# Patient Record
Sex: MALE | Race: WHITE | Employment: FULL TIME | URBAN - METROPOLITAN AREA
[De-identification: names, ages, dates, MRNs, and addresses within clinical notes are randomized per-mention and may not be internally consistent; named-entity substitution may affect disease eponyms.]

---

## 2017-07-16 DIAGNOSIS — E78.5 HYPERLIPIDEMIA LDL GOAL <130: ICD-10-CM

## 2017-07-17 NOTE — TELEPHONE ENCOUNTER
SIMVASTATIN 10MG TABLETS       Last Written Prescription Date: 6/7/16  Last Fill Quantity: 90, # refills: 3  Last Office Visit with G, P or [facility] prescribing provider: 9/20/16       Lab Results   Component Value Date    CHOL 155 09/15/2016     Lab Results   Component Value Date    HDL 44 09/15/2016     Lab Results   Component Value Date    LDL 90 09/15/2016     Lab Results   Component Value Date    TRIG 104 09/15/2016     Lab Results   Component Value Date    CHOLHDLRATIO 3.2 11/06/2014

## 2017-07-19 DIAGNOSIS — E78.5 HYPERLIPIDEMIA LDL GOAL <130: ICD-10-CM

## 2017-07-19 RX ORDER — SIMVASTATIN 10 MG
TABLET ORAL
Qty: 90 TABLET | Refills: 0 | OUTPATIENT
Start: 2017-07-19

## 2017-07-19 RX ORDER — SIMVASTATIN 10 MG
TABLET ORAL
Qty: 90 TABLET | Refills: 0 | Status: SHIPPED | OUTPATIENT
Start: 2017-07-19 | End: 2018-02-25

## 2017-07-19 NOTE — TELEPHONE ENCOUNTER
Prescription approved per [facility] [place].  Susan Samson RN

## 2017-08-04 ENCOUNTER — RADIANT APPOINTMENT (OUTPATIENT)
Dept: GENERAL RADIOLOGY | Facility: CLINIC | Age: 47
End: 2017-08-04
Attending: FAMILY MEDICINE
Payer: COMMERCIAL

## 2017-08-04 ENCOUNTER — OFFICE VISIT (OUTPATIENT)
Dept: FAMILY MEDICINE | Facility: CLINIC | Age: 47
End: 2017-08-04
Payer: COMMERCIAL

## 2017-08-04 VITALS
TEMPERATURE: 96.4 F | SYSTOLIC BLOOD PRESSURE: 121 MMHG | OXYGEN SATURATION: 98 % | HEIGHT: 74 IN | HEART RATE: 54 BPM | BODY MASS INDEX: 27.08 KG/M2 | WEIGHT: 211 LBS | DIASTOLIC BLOOD PRESSURE: 82 MMHG

## 2017-08-04 DIAGNOSIS — R07.81 RIB PAIN ON LEFT SIDE: ICD-10-CM

## 2017-08-04 DIAGNOSIS — R07.81 RIB PAIN ON RIGHT SIDE: Primary | ICD-10-CM

## 2017-08-04 DIAGNOSIS — R07.81 RIB PAIN ON RIGHT SIDE: ICD-10-CM

## 2017-08-04 LAB
ALBUMIN UR-MCNC: NEGATIVE MG/DL
APPEARANCE UR: CLEAR
BILIRUB UR QL STRIP: NEGATIVE
COLOR UR AUTO: YELLOW
GLUCOSE UR STRIP-MCNC: NEGATIVE MG/DL
HGB UR QL STRIP: NEGATIVE
KETONES UR STRIP-MCNC: NEGATIVE MG/DL
LEUKOCYTE ESTERASE UR QL STRIP: NEGATIVE
NITRATE UR QL: NEGATIVE
PH UR STRIP: 5.5 PH (ref 5–7)
SP GR UR STRIP: 1.02 (ref 1–1.03)
URN SPEC COLLECT METH UR: NORMAL
UROBILINOGEN UR STRIP-ACNC: 0.2 EU/DL (ref 0.2–1)

## 2017-08-04 PROCEDURE — 81003 URINALYSIS AUTO W/O SCOPE: CPT | Performed by: FAMILY MEDICINE

## 2017-08-04 PROCEDURE — 71101 X-RAY EXAM UNILAT RIBS/CHEST: CPT | Mod: RT

## 2017-08-04 PROCEDURE — 99214 OFFICE O/P EST MOD 30 MIN: CPT | Performed by: FAMILY MEDICINE

## 2017-08-04 NOTE — MR AVS SNAPSHOT
"              After Visit Summary   8/4/2017    Fercho Phillips    MRN: [id number]           Patient Information     Date Of Birth          1970        Visit Information        Provider Department      8/4/2017 1:40 PM Ranulfo Najera MD [facility] ([facility]        Today's Diagnoses     Rib pain on right side    -  1       Follow-ups after your visit        Who to contact     Normal or non-critical lab and imaging results will be communicated to you by Sylantrohart, letter or phone within 4 business days after the clinic has received the results. If you do not hear from us within 7 days, please contact the clinic through Sylantrohart or phone. If you have a critical or abnormal lab result, we will notify you by phone as soon as possible.  Submit refill requests through Beatrobo or call your pharmacy and they will forward the refill request to us. Please allow 3 business days for your refill to be completed.          If you need to speak with a  for additional information , please call: [phone]             Additional Information About Your Visit        Beatrobo Information     Beatrobo gives you secure access to your electronic health record. If you see a primary care provider, you can also send messages to your care team and make appointments. If you have questions, please call your primary care clinic.  If you do not have a primary care provider, please call [phone] and they will assist you.        Care EveryWhere ID     This is your Care EveryWhere ID. This could be used by other organizations to access your [place] medical records  [id number]        Your Vitals Were     Pulse Temperature Height Pulse Oximetry BMI (Body Mass Index)       54 96.4  F (35.8  C) (Tympanic) 6' 1.5\" (1.867 m) 98% 27.46 kg/m2        Blood Pressure from Last 3 Encounters:   08/04/17 121/82   11/10/16 116/82   09/20/16 114/78    Weight from Last 3 Encounters:   08/04/17 211 lb (95.7 kg)   11/10/16 204 lb 11.2 oz (92.9 " kg)   09/20/16 202 lb (91.6 kg)              We Performed the Following     *UA reflex to Microscopic and Culture ([place] and [facility] (except [place] and [place])        Primary Care Provider Office Phone # Fax #    Ranulfo Najera [phone]       [facility] [address] [facility] [address]        Equal Access to Services     [facility] HONORIO : Hadii aad ku hadasho Soomaali, waaxda luqadaha, qaybta kaalmada adeegyada, waxay idiin hayaan adeeg kharash la'aan ah. So [facility] [phone].    ATENCIÓN: Si habla español, tiene a kaur disposición servicios gratuitos de asistencia lingüística. Llame al [phone].    We comply with applicable federal civil rights laws and [place] laws. We do not discriminate on the basis of race, color, national origin, age, disability sex, sexual orientation or gender identity.            Thank you!     Thank you for choosing [facility]  for your care. Our goal is always to provide you with excellent care. Hearing back from our patients is one way we can continue to improve our services. Please take a few minutes to complete the written survey that you may receive in the mail after your visit with us. Thank you!             Your Updated Medication List - Protect others around you: Learn how to safely use, store and throw away your medicines at www.disposemymeds.org.          This list is accurate as of: 8/4/17 11:59 PM.  Always use your most recent med list.                   Brand Name Dispense Instructions for use Diagnosis    aspirin 81 MG tablet      1 TABLET DAILY        cinnamon 500 MG Tabs      Take 1 tablet by mouth daily.        Co Q 10 10 MG Caps      Take  by mouth.        FISH OIL PO      Take 1 capsule by mouth daily.        flax seed oil 1000 MG capsule      Take 1 capsule by mouth daily.        FRUIT & VEGETABLE DAILY PO      Take 1 capsule by mouth daily.        Garlic 1000 MG Caps      Take 1 tablet by mouth daily.         ibuprofen 200 MG tablet    ADVIL/MOTRIN     Take 2 tablets by mouth daily as needed.        Multi-vitamin Tabs tablet   Generic drug:  multivitamin, therapeutic with minerals      Take 1 tablet by mouth daily.        NIACIN PO      Take 1 tablet by mouth daily        red yeast rice 600 MG Caps      Take 1 capsule by mouth daily.        Resveratrol 100 MG Caps      Take 1 capsule by mouth daily.        simvastatin 10 MG tablet    ZOCOR    90 tablet    TAKE 1 TABLET(10 MG) BY MOUTH AT BEDTIME    Hyperlipidemia LDL goal <130       UNABLE TO FIND      Take 1 tablet by mouth daily. MEDICATION NAME: L-Anginine & L-Citrulline 500mg        VITAMIN D3 PO      Take 1 tablet by mouth daily.

## 2017-08-04 NOTE — NURSING NOTE
"Chief Complaint   Patient presents with     Rib Pain       Initial /82 (BP Location: Right arm, Patient Position: Sitting, Cuff Size: Adult Large)  Pulse 54  Temp 96.4  F (35.8  C) (Tympanic)  Ht 6' 1.5\" (1.867 m)  Wt 211 lb (95.7 kg)  BMI 27.46 kg/m2 Estimated body mass index is 27.46 kg/(m^2) as calculated from the following:    Height as of this encounter: 6' 1.5\" (1.867 m).    Weight as of this encounter: 211 lb (95.7 kg).  Medication Reconciliation: complete   Shelley Pacheco LPN    "

## 2017-08-04 NOTE — PROGRESS NOTES
"  SUBJECTIVE:                                                    Fercho Phillips is a 47 year old male who presents to clinic today for the following health issues:    Rib pain-    Patient said the pain started about 2 weeks ago on the lower right side of his ribs. Patient said he has been having trouble sleeping on his left side due to the pain on his right lower rib area. Patient said the pain is \"achy\" and is intermittent. Patient denies any injury to the area.  Patient denies any shortness of breath or trouble breathing.       Problem list and histories reviewed & adjusted, as indicated.  Additional history: as documented    Patient Active Problem List   Diagnosis     HYPERLIPIDEMIA LDL GOAL <130     Achilles tendon tear     Abnormality of gait     Elevated LFTs     Health Care Home     Past Surgical History:   Procedure Laterality Date     COLONOSCOPY N/A 1/11/2016    Procedure: COLONOSCOPY;  Surgeon: Kumar Nunez MD;  Location: [facility]     Left shoulder scope      Left shoulder scope     Right elbow repair      Right elbow repair     Right thumb repair      Right thumb repair       Social History   Substance Use Topics     Smoking status: Never Smoker     Smokeless tobacco: Never Used     Alcohol use Yes      Comment: 2-3 beers per week     Family History   Problem Relation Age of Onset     HEART DISEASE Father      heart disease, angioplasty with stent     Cancer - colorectal Father      C.A.D. Father      Breast Cancer Mother              Reviewed and updated as needed this visit by clinical staff       Reviewed and updated as needed this visit by Provider         ROS:  Constitutional, HEENT, cardiovascular, pulmonary, gi and gu systems are negative, except as otherwise noted.    OBJECTIVE:   /82 (BP Location: Right arm, Patient Position: Sitting, Cuff Size: Adult Large)  Pulse 54  Temp 96.4  F (35.8  C) (Tympanic)  Ht 6' 1.5\" (1.867 m)  Wt 211 lb (95.7 kg)  SpO2 98%  BMI 27.46 kg/m2  Body mass " index is 27.46 kg/(m^2).  GENERAL: healthy, alert and no distress  NECK: no adenopathy, no asymmetry, masses, or scars and thyroid normal to palpation  RESP: lungs clear to auscultation - no rales, rhonchi or wheezes  CV: regular rate and rhythm, normal S1 S2, no S3 or S4, no murmur, click or rub, no peripheral edema and peripheral pulses strong  ABDOMEN: soft, nontender, no hepatosplenomegaly, no masses and bowel sounds normal  MS: no gross musculoskeletal defects noted, no edema    Diagnostic Test Results:  none     ASSESSMENT/PLAN:   1. Rib pain on right side  UA reflex to Microscopic and Culture ([place] and [facility] (except [place] and [place])   XR Ribs & Chest Right G/E 3 Views;    Most likely muscle pull/. He will work on posture, stretching  return to clinic, or call if unimproved in 2-4 weeks sooner if worse.              Ranulfo Najera MD  [facility]

## 2018-02-25 DIAGNOSIS — E78.5 HYPERLIPIDEMIA LDL GOAL <130: ICD-10-CM

## 2018-02-26 NOTE — TELEPHONE ENCOUNTER
"SIMVASTATIN 10MG TABLETS        Last Written Prescription Date:  7/19/17  Last Fill Quantity: 90,   # refills: 0  Last Office Visit: 2/14/18  Future Office visit:       Requested Prescriptions   Pending Prescriptions Disp Refills     simvastatin (ZOCOR) 10 MG tablet [Pharmacy Med Name: SIMVASTATIN 10MG TABLETS] 90 tablet 0     Sig: TAKE 1 TABLET BY MOUTH EVERY DAY IN THE EVENING    Statins Protocol Failed    2/25/2018  9:41 AM       Failed - LDL on file in past 12 months    Recent Labs   Lab Test  09/15/16   0933   LDL  90            Passed - No abnormal creatine kinase in past 12 months    No lab results found.         Passed - Recent or future visit with authorizing provider    Patient had office visit in the last year or has a visit in the next 30 days with authorizing provider.  See \"Patient Info\" tab in inbasket, or \"Choose Columns\" in Meds & Orders section of the refill encounter.            Passed - Patient is age 18 or older          "

## 2018-02-27 NOTE — TELEPHONE ENCOUNTER
Routing refill request to provider for review/approval because:  Patient needs to be seen because it has been more than 1 year since last office visit. Medication last reviewed on 4-11-16.  Labs not current:      LDL Cholesterol Calculated 90  <100 mg/dL Final 09/15/2016  9:33 AM     Looks like there is a break in the medication, last refill on 7-19-17 for 90 days with 0 refills.    KIMBERLY Alonzo

## 2018-02-28 RX ORDER — SIMVASTATIN 10 MG
TABLET ORAL
Qty: 90 TABLET | Refills: 0 | Status: SHIPPED | OUTPATIENT
Start: 2018-02-28 | End: 2018-06-06

## 2018-05-21 ENCOUNTER — TRANSFERRED RECORDS (OUTPATIENT)
Dept: HEALTH INFORMATION MANAGEMENT | Facility: CLINIC | Age: 48
End: 2018-05-21

## 2018-06-06 DIAGNOSIS — E78.5 HYPERLIPIDEMIA LDL GOAL <130: ICD-10-CM

## 2018-06-07 RX ORDER — SIMVASTATIN 10 MG
10 TABLET ORAL AT BEDTIME
Qty: 90 TABLET | Refills: 0 | Status: SHIPPED | OUTPATIENT
Start: 2018-06-07 | End: 2018-10-04

## 2018-06-07 NOTE — TELEPHONE ENCOUNTER
"SIMVASTATIN 10MG TABLETS        Last Written Prescription Date:  2/28/18  Last Fill Quantity: 90,   # refills: 0  Last Office Visit: 2/14/18  Future Office visit:       Requested Prescriptions   Pending Prescriptions Disp Refills     simvastatin (ZOCOR) 10 MG tablet [Pharmacy Med Name: SIMVASTATIN 10MG TABLETS] 90 tablet 0     Sig: TAKE 1 TABLET BY MOUTH EVERY DAY IN THE EVENING    Statins Protocol Failed    6/6/2018  6:27 PM       Failed - LDL on file in past 12 months    Recent Labs   Lab Test  09/15/16   0933   LDL  90            Passed - No abnormal creatine kinase in past 12 months    No lab results found.            Passed - Recent (12 mo) or future (30 days) visit within the authorizing provider's specialty    Patient had office visit in the last 12 months or has a visit in the next 30 days with authorizing provider or within the authorizing provider's specialty.  See \"Patient Info\" tab in inbasket, or \"Choose Columns\" in Meds & Orders section of the refill encounter.           Passed - Patient is age 18 or older          "

## 2018-10-04 ENCOUNTER — TELEPHONE (OUTPATIENT)
Dept: FAMILY MEDICINE | Facility: CLINIC | Age: 48
End: 2018-10-04

## 2018-10-04 DIAGNOSIS — E78.5 HYPERLIPIDEMIA LDL GOAL <130: ICD-10-CM

## 2018-10-05 DIAGNOSIS — E78.5 HYPERLIPIDEMIA LDL GOAL <130: ICD-10-CM

## 2018-10-05 RX ORDER — SIMVASTATIN 10 MG
TABLET ORAL
Qty: 90 TABLET | Refills: 0 | OUTPATIENT
Start: 2018-10-05

## 2018-10-05 RX ORDER — SIMVASTATIN 10 MG
TABLET ORAL
Qty: 10 TABLET | Refills: 0 | Status: SHIPPED | OUTPATIENT
Start: 2018-10-05 | End: 2018-10-08

## 2018-10-05 NOTE — TELEPHONE ENCOUNTER
Fercho calling for his medication.  He is out.  He did make an appointment for Monday with Dr. Najera.  Can he just get enough for today and over the weekend?  Please review and advise. Thank you..Kisha Crum

## 2018-10-05 NOTE — TELEPHONE ENCOUNTER
Sent refill for 10 pills only till patient is seen. Left message to call us back to notify.    KIMBERLY Alonzo

## 2018-10-05 NOTE — TELEPHONE ENCOUNTER
"Requested Prescriptions   Pending Prescriptions Disp Refills     simvastatin (ZOCOR) 10 MG tablet [Pharmacy Med Name: SIMVASTATIN 10MG TABLETS] 90 tablet 0    Last Written Prescription Date:  6/7/18  Last Fill Quantity: 90,  # refills: 0   Last office visit: 8/4/2017 with prescribing provider:  [facility]   Future Office Visit:     Sig: TAKE 1 TABLET(10 MG) BY MOUTH AT BEDTIME    Statins Protocol Failed    10/4/2018 10:53 PM       Failed - LDL on file in past 12 months    Recent Labs   Lab Test  09/15/16   0933   LDL  90            Failed - Recent (12 mo) or future (30 days) visit within the authorizing provider's specialty    Patient had office visit in the last 12 months or has a visit in the next 30 days with authorizing provider or within the authorizing provider's specialty.  See \"Patient Info\" tab in inbasket, or \"Choose Columns\" in Meds & Orders section of the refill encounter.           Passed - No abnormal creatine kinase in past 12 months    No lab results found.            Passed - Patient is age 18 or older          "

## 2018-10-08 ENCOUNTER — OFFICE VISIT (OUTPATIENT)
Dept: FAMILY MEDICINE | Facility: CLINIC | Age: 48
End: 2018-10-08
Payer: COMMERCIAL

## 2018-10-08 VITALS
HEIGHT: 73 IN | TEMPERATURE: 97.9 F | BODY MASS INDEX: 27.73 KG/M2 | HEART RATE: 65 BPM | DIASTOLIC BLOOD PRESSURE: 76 MMHG | SYSTOLIC BLOOD PRESSURE: 132 MMHG | WEIGHT: 209.2 LBS

## 2018-10-08 DIAGNOSIS — E78.5 HYPERLIPIDEMIA LDL GOAL <130: ICD-10-CM

## 2018-10-08 DIAGNOSIS — Z23 NEED FOR PROPHYLACTIC VACCINATION AND INOCULATION AGAINST INFLUENZA: ICD-10-CM

## 2018-10-08 DIAGNOSIS — J02.9 ACUTE PHARYNGITIS, UNSPECIFIED ETIOLOGY: ICD-10-CM

## 2018-10-08 DIAGNOSIS — R79.89 ELEVATED LFTS: Primary | ICD-10-CM

## 2018-10-08 LAB
ALBUMIN SERPL-MCNC: 4.3 G/DL (ref 3.4–5)
ALP SERPL-CCNC: 60 U/L (ref 40–150)
ALT SERPL W P-5'-P-CCNC: 37 U/L (ref 0–70)
ANION GAP SERPL CALCULATED.3IONS-SCNC: 8 MMOL/L (ref 3–14)
AST SERPL W P-5'-P-CCNC: 25 U/L (ref 0–45)
BILIRUB DIRECT SERPL-MCNC: 0.2 MG/DL (ref 0–0.2)
BILIRUB SERPL-MCNC: 1.2 MG/DL (ref 0.2–1.3)
BUN SERPL-MCNC: 13 MG/DL (ref 7–30)
CALCIUM SERPL-MCNC: 9 MG/DL (ref 8.5–10.1)
CHLORIDE SERPL-SCNC: 104 MMOL/L (ref 94–109)
CHOLEST SERPL-MCNC: 159 MG/DL
CO2 SERPL-SCNC: 26 MMOL/L (ref 20–32)
CREAT SERPL-MCNC: 0.97 MG/DL (ref 0.66–1.25)
GFR SERPL CREATININE-BSD FRML MDRD: 82 ML/MIN/1.7M2
GLUCOSE SERPL-MCNC: 103 MG/DL (ref 70–99)
HDLC SERPL-MCNC: 43 MG/DL
HETEROPH AB SER QL: NEGATIVE
LDLC SERPL CALC-MCNC: 102 MG/DL
NONHDLC SERPL-MCNC: 116 MG/DL
POTASSIUM SERPL-SCNC: 4.1 MMOL/L (ref 3.4–5.3)
PROT SERPL-MCNC: 7.4 G/DL (ref 6.8–8.8)
SODIUM SERPL-SCNC: 138 MMOL/L (ref 133–144)
TRIGL SERPL-MCNC: 72 MG/DL

## 2018-10-08 PROCEDURE — 86308 HETEROPHILE ANTIBODY SCREEN: CPT | Performed by: FAMILY MEDICINE

## 2018-10-08 PROCEDURE — 80053 COMPREHEN METABOLIC PANEL: CPT | Performed by: FAMILY MEDICINE

## 2018-10-08 PROCEDURE — 36415 COLL VENOUS BLD VENIPUNCTURE: CPT | Performed by: FAMILY MEDICINE

## 2018-10-08 PROCEDURE — 90686 IIV4 VACC NO PRSV 0.5 ML IM: CPT | Performed by: FAMILY MEDICINE

## 2018-10-08 PROCEDURE — 80061 LIPID PANEL: CPT | Performed by: FAMILY MEDICINE

## 2018-10-08 PROCEDURE — 82248 BILIRUBIN DIRECT: CPT | Performed by: FAMILY MEDICINE

## 2018-10-08 PROCEDURE — 99213 OFFICE O/P EST LOW 20 MIN: CPT | Mod: 25 | Performed by: FAMILY MEDICINE

## 2018-10-08 PROCEDURE — 90471 IMMUNIZATION ADMIN: CPT | Performed by: FAMILY MEDICINE

## 2018-10-08 RX ORDER — SIMVASTATIN 10 MG
TABLET ORAL
Qty: 90 TABLET | Refills: 0 | Status: SHIPPED | OUTPATIENT
Start: 2018-10-08 | End: 2018-10-24

## 2018-10-08 NOTE — PROGRESS NOTES
SUBJECTIVE:   Fercho Phillips is a 48 year old male who presents to clinic today for the following health issues:    Hyperlipidemia Follow-Up      Rate your low fat/cholesterol diet?: good    Taking statin?  Yes, no muscle aches from statin    Other lipid medications/supplements?:  none      Amount of exercise or physical activity: 4-5 days/week for an average of 30-45 minutes    Problems taking medications regularly: No    Medication side effects: none    Diet: low fat/cholesterol    *  Twitching in left cheek, started Friday             Problem list and histories reviewed & adjusted, as indicated.  Additional history: as documented    Patient Active Problem List   Diagnosis     HYPERLIPIDEMIA LDL GOAL <130     Achilles tendon tear     Abnormality of gait     Elevated LFTs     Health Care Home     Past Surgical History:   Procedure Laterality Date     COLONOSCOPY N/A 1/11/2016    Procedure: COLONOSCOPY;  Surgeon: Kumar Nunez MD;  Location: [facility]     Left shoulder scope      Left shoulder scope     Right elbow repair      Right elbow repair     Right thumb repair      Right thumb repair       Social History   Substance Use Topics     Smoking status: Never Smoker     Smokeless tobacco: Never Used     Alcohol use Yes      Comment: 2-3 beers per week     Family History   Problem Relation Age of Onset     HEART DISEASE Father      heart disease, angioplasty with stent     Cancer - colorectal Father      C.A.D. Father      Breast Cancer Mother          Current Outpatient Prescriptions   Medication Sig Dispense Refill     ASPIRIN 81 MG OR TABS 1 TABLET DAILY       Cholecalciferol (VITAMIN D3 PO) Take 1 tablet by mouth daily.       cinnamon 500 MG TABS Take 1 tablet by mouth daily.       Coenzyme Q10 (CO Q 10) 10 MG CAPS Take  by mouth.       Flaxseed, Linseed, (FLAX SEED OIL) 1000 MG capsule Take 1 capsule by mouth daily.       Garlic 1000 MG CAPS Take 1 tablet by mouth daily.       Multiple Vitamin (MULTI-VITAMIN) per  "tablet Take 1 tablet by mouth daily.       NIACIN PO Take 1 tablet by mouth daily       Nutritional Supplements (FRUIT & VEGETABLE DAILY PO) Take 1 capsule by mouth daily.       Omega-3 Fatty Acids (FISH OIL PO) Take 1 capsule by mouth daily.       red yeast rice 600 MG CAPS Take 1 capsule by mouth daily.       Resveratrol 100 MG CAPS Take 1 capsule by mouth daily.       simvastatin (ZOCOR) 10 MG tablet TAKE 1 TABLET(10 MG) BY MOUTH AT BEDTIME 90 tablet 0     UNABLE TO FIND Take 1 tablet by mouth daily. MEDICATION NAME: L-Anginine & L-Citrulline  500mg       ibuprofen (ADVIL,MOTRIN) 200 MG tablet Take 2 tablets by mouth daily as needed.       [DISCONTINUED] simvastatin (ZOCOR) 10 MG tablet TAKE 1 TABLET(10 MG) BY MOUTH AT BEDTIME 10 tablet 0     No Known Allergies    Reviewed and updated as needed this visit by clinical staff  Tobacco  Allergies  Meds  Med Hx  Surg Hx  Fam Hx  Soc Hx      Reviewed and updated as needed this visit by Provider         ROS:  Constitutional, HEENT, cardiovascular, pulmonary, gi and gu systems are negative, except as otherwise noted.    OBJECTIVE:     /76  Pulse 65  Temp 97.9  F (36.6  C) (Tympanic)  Ht 6' 1.43\" (1.865 m)  Wt 209 lb 3.2 oz (94.9 kg)  BMI 27.28 kg/m2  Body mass index is 27.28 kg/(m^2).  GENERAL: healthy, alert and no distress  NECK: no adenopathy, no asymmetry, masses, or scars and thyroid normal to palpation  RESP: lungs clear to auscultation - no rales, rhonchi or wheezes  CV: regular rate and rhythm, normal S1 S2, no S3 or S4, no murmur, click or rub, no peripheral edema and peripheral pulses strong  ABDOMEN: soft, nontender, no hepatosplenomegaly, no masses and bowel sounds normal  MS: no gross musculoskeletal defects noted, no edema    Diagnostic Test Results:  none     ASSESSMENT/PLAN:       1. Hyperlipidemia LDL goal <130    - simvastatin (ZOCOR) 10 MG tablet; TAKE 1 TABLET(10 MG) BY MOUTH AT BEDTIME  Dispense: 90 tablet; Refill: 0  - Lipid panel " reflex to direct LDL Fasting    2. Elevated LFTs    - Comprehensive metabolic panel (BMP + Alb, Alk Phos, ALT, AST, Total. Bili, TP)  - Bilirubin direct    3. Acute pharyngitis, unspecified etiology    - Mononucleosis screen  - Comprehensive metabolic panel (BMP + Alb, Alk Phos, ALT, AST, Total. Bili, TP)    4. Need for prophylactic vaccination and inoculation against influenza    - FLU VACCINE, SPLIT VIRUS, IM (QUADRIVALENT) [59128]- >3 YRS  - Vaccine Administration, Initial [85169]        Ranulfo Najera MD  [facility]    Injectable Influenza Immunization Documentation    1.  Is the person to be vaccinated sick today?   No    2. Does the person to be vaccinated have an allergy to a component   of the vaccine?   No  Egg Allergy Algorithm Link    3. Has the person to be vaccinated ever had a serious reaction   to influenza vaccine in the past?   No    4. Has the person to be vaccinated ever had Guillain-Barré syndrome?   No    Form completed by Merna Muhammad CMA

## 2018-10-08 NOTE — MR AVS SNAPSHOT
"              After Visit Summary   10/8/2018    Fercho Phillips    MRN: [id number]           Patient Information     Date Of Birth          1970        Visit Information        Provider Department      10/8/2018 11:20 AM Ranulfo Najera MD [facility]        Today's Diagnoses     Elevated LFTs    -  1    Hyperlipidemia LDL goal <130        Acute pharyngitis, unspecified etiology           Follow-ups after your visit        Who to contact     Normal or non-critical lab and imaging results will be communicated to you by Instabeathart, letter or phone within 4 business days after the clinic has received the results. If you do not hear from us within 7 days, please contact the clinic through Instabeathart or phone. If you have a critical or abnormal lab result, we will notify you by phone as soon as possible.  Submit refill requests through Spotbros or call your pharmacy and they will forward the refill request to us. Please allow 3 business days for your refill to be completed.          If you need to speak with a  for additional information , please call: [phone]             Additional Information About Your Visit        InstabeatharGrid20/20 Information     Spotbros gives you secure access to your electronic health record. If you see a primary care provider, you can also send messages to your care team and make appointments. If you have questions, please call your primary care clinic.  If you do not have a primary care provider, please call [phone] and they will assist you.        Care EveryWhere ID     This is your Care EveryWhere ID. This could be used by other organizations to access your [place] medical records  [id number]        Your Vitals Were     Pulse Temperature Height BMI (Body Mass Index)          65 97.9  F (36.6  C) (Tympanic) 6' 1.43\" (1.865 m) 27.28 kg/m2         Blood Pressure from Last 3 Encounters:   10/08/18 132/76   08/04/17 121/82   11/10/16 116/82    Weight from Last 3 " Encounters:   10/08/18 209 lb 3.2 oz (94.9 kg)   08/04/17 211 lb (95.7 kg)   11/10/16 204 lb 11.2 oz (92.9 kg)              We Performed the Following     Hepatic panel (Albumin, ALT, AST, Bili, Alk Phos, TP)     Lipid panel reflex to direct LDL Fasting     Mononucleosis screen          Where to get your medicines      These medications [id number] - [place], [place] [facility] [address], [place] [address]     Phone:  [phone]     simvastatin 10 MG tablet          Primary Care Provider Office Phone # Fax #    Ranulfo Najera [phone] [address] [facility] [address]        Equal Access to Services     CLARA OLMEDO : Hadii aad ku hadasho [place]ank, waaxda luqadaha, qaybta kaalmada adeisiyaseb, norma meléndez . So [facility] [phone].    ATENCIÓN: Si habla español, tiene a kaur disposición servicios gratuitos de asistencia lingüística. [facility] [phone].    We comply with applicable federal civil rights laws and [place] laws. We do not discriminate on the basis of race, color, national origin, age, disability, sex, sexual orientation, or gender identity.            Thank you!     Thank you for choosing [facility]  for your care. Our goal is always to provide you with excellent care. Hearing back from our patients is one way we can continue to improve our services. Please take a few minutes to complete the written survey that you may receive in the mail after your visit with us. Thank you!             Your Updated Medication List - Protect others around you: Learn how to safely use, store and throw away your medicines at www.disposemymeds.org.          This list is accurate as of 10/8/18 12:11 PM.  Always use your most recent med list.                   Brand Name Dispense Instructions for use Diagnosis    aspirin 81 MG tablet      1 TABLET DAILY        cinnamon 500  MG Tabs      Take 1 tablet by mouth daily.        Co Q 10 10 MG Caps      Take  by mouth.        FISH OIL PO      Take 1 capsule by mouth daily.        flax seed oil 1000 MG capsule      Take 1 capsule by mouth daily.        FRUIT & VEGETABLE DAILY PO      Take 1 capsule by mouth daily.        Garlic 1000 MG Caps      Take 1 tablet by mouth daily.        ibuprofen 200 MG tablet    ADVIL/MOTRIN     Take 2 tablets by mouth daily as needed.        Multi-vitamin Tabs tablet   Generic drug:  multivitamin, therapeutic with minerals      Take 1 tablet by mouth daily.        NIACIN PO      Take 1 tablet by mouth daily        red yeast rice 600 MG Caps      Take 1 capsule by mouth daily.        Resveratrol 100 MG Caps      Take 1 capsule by mouth daily.        simvastatin 10 MG tablet    ZOCOR    90 tablet    TAKE 1 TABLET(10 MG) BY MOUTH AT BEDTIME    Hyperlipidemia LDL goal <130       UNABLE TO FIND      Take 1 tablet by mouth daily. MEDICATION NAME: L-Anginine & L-Citrulline 500mg        VITAMIN D3 PO      Take 1 tablet by mouth daily.

## 2018-10-15 ENCOUNTER — OFFICE VISIT (OUTPATIENT)
Dept: FAMILY MEDICINE | Facility: CLINIC | Age: 48
End: 2018-10-15
Payer: COMMERCIAL

## 2018-10-15 VITALS
SYSTOLIC BLOOD PRESSURE: 136 MMHG | TEMPERATURE: 97.7 F | DIASTOLIC BLOOD PRESSURE: 80 MMHG | OXYGEN SATURATION: 98 % | WEIGHT: 214.4 LBS | HEART RATE: 76 BPM | HEIGHT: 73 IN | BODY MASS INDEX: 28.41 KG/M2

## 2018-10-15 DIAGNOSIS — J20.9 ACUTE BRONCHITIS, UNSPECIFIED ORGANISM: ICD-10-CM

## 2018-10-15 DIAGNOSIS — R82.90 NONSPECIFIC FINDING ON EXAMINATION OF URINE: Primary | ICD-10-CM

## 2018-10-15 LAB
DEPRECATED S PYO AG THROAT QL EIA: NORMAL
FLUAV+FLUBV AG SPEC QL: NEGATIVE
FLUAV+FLUBV AG SPEC QL: NEGATIVE
SPECIMEN SOURCE: NORMAL
SPECIMEN SOURCE: NORMAL

## 2018-10-15 PROCEDURE — 87081 CULTURE SCREEN ONLY: CPT | Performed by: FAMILY MEDICINE

## 2018-10-15 PROCEDURE — 87804 INFLUENZA ASSAY W/OPTIC: CPT | Performed by: FAMILY MEDICINE

## 2018-10-15 PROCEDURE — 87880 STREP A ASSAY W/OPTIC: CPT | Performed by: FAMILY MEDICINE

## 2018-10-15 PROCEDURE — 99213 OFFICE O/P EST LOW 20 MIN: CPT | Performed by: FAMILY MEDICINE

## 2018-10-15 RX ORDER — DOXYCYCLINE 100 MG/1
100 CAPSULE ORAL 2 TIMES DAILY
Qty: 20 CAPSULE | Refills: 0 | Status: SHIPPED | OUTPATIENT
Start: 2018-10-15 | End: 2019-01-17

## 2018-10-15 NOTE — PROGRESS NOTES
"  SUBJECTIVE:   Fercho Phillips is a 48 year old male who presents to clinic today for the following health issues:    ENT Symptoms             Symptoms: cc Present Absent Comment   Fever/Chills   x    Fatigue  x     Muscle Aches   x    Eye Irritation   x    Sneezing  x     Nasal Shane/Drg  x     Sinus Pressure/Pain  x     Loss of smell   x    Dental pain   x    Sore Throat x x     Swollen Glands   x    Ear Pain/Fullness  x     Cough  x     Wheeze   x    Chest Pain  x     Shortness of breath  x     Rash   x    Other   x      Symptom duration:  1 week    Symptom severity:  moderate    Treatments tried:  Ibuprofen    Contacts:  School - kids             Problem list and histories reviewed & adjusted, as indicated.  Additional history: as documented    Patient Active Problem List   Diagnosis     HYPERLIPIDEMIA LDL GOAL <130     Achilles tendon tear     Abnormality of gait     Elevated LFTs     Health Care Home     Past Surgical History:   Procedure Laterality Date     COLONOSCOPY N/A 1/11/2016    Procedure: COLONOSCOPY;  Surgeon: Kumar Nunez MD;  Location: [facility]     Left shoulder scope      Left shoulder scope     Right elbow repair      Right elbow repair     Right thumb repair      Right thumb repair       Social History   Substance Use Topics     Smoking status: Never Smoker     Smokeless tobacco: Never Used     Alcohol use Yes      Comment: 2-3 beers per week     Family History   Problem Relation Age of Onset     HEART DISEASE Father      heart disease, angioplasty with stent     Cancer - colorectal Father      C.A.D. Father      Breast Cancer Mother            Reviewed and updated as needed this visit by clinical staff       Reviewed and updated as needed this visit by Provider         ROS:  Constitutional, HEENT, cardiovascular, pulmonary, gi and gu systems are negative, except as otherwise noted.    OBJECTIVE:     /80  Pulse 76  Temp 97.7  F (36.5  C) (Tympanic)  Ht 6' 1.2\" (1.859 m)  Wt 214 lb 6.4 oz " (97.3 kg)  SpO2 98%  BMI 28.13 kg/m2  Body mass index is 28.13 kg/(m^2).  GENERAL: healthy, alert and no distress  NECK: no adenopathy, no asymmetry, masses, or scars and thyroid normal to palpation  RESP: lungs clear to auscultation - no rales, rhonchi or wheezes  CV: regular rate and rhythm, normal S1 S2, no S3 or S4, no murmur, click or rub, no peripheral edema and peripheral pulses strong  ABDOMEN: soft, nontender, no hepatosplenomegaly, no masses and bowel sounds normal  MS: no gross musculoskeletal defects noted, no edema      ASSESSMENT/PLAN:       1. Nonspecific finding on examination of urine    - Influenza A/B antigen  - Beta strep group A culture    2. Acute bronchitis, unspecified organism    - doxycycline (VIBRAMYCIN) 100 MG capsule; Take 1 capsule (100 mg) by mouth 2 times daily  Dispense: 20 capsule; Refill: 0    Ranulfo Najera MD  [facility]

## 2018-10-15 NOTE — MR AVS SNAPSHOT
"              After Visit Summary   10/15/2018    Fercho Phillips    MRN: [id number]           Patient Information     Date Of Birth          1970        Visit Information        Provider Department      10/15/2018 10:40 AM Ranulfo Najera MD [facility]        Today's Diagnoses     Nonspecific finding on examination of urine    -  1    Acute bronchitis, unspecified organism           Follow-ups after your visit        Who to contact     Normal or non-critical lab and imaging results will be communicated to you by [employer], letter or phone within 4 business days after the clinic has received the results. If you do not hear from us within 7 days, please contact the clinic through [employer] or phone. If you have a critical or abnormal lab result, we will notify you by phone as soon as possible.  Submit refill requests through [employer] or call your pharmacy and they will forward the refill request to us. Please allow 3 business days for your refill to be completed.          If you need to speak with a  for additional information , please call: [phone]             Additional Information About Your Visit        [employer] Information     [employer] gives you secure access to your electronic health record. If you see a primary care provider, you can also send messages to your care team and make appointments. If you have questions, please call your primary care clinic.  If you do not have a primary care provider, please call [phone] and they will assist you.        Care EveryWhere ID     This is your Care EveryWhere ID. This could be used by other organizations to access your [place] medical records  [id number]        Your Vitals Were     Pulse Temperature Height Pulse Oximetry BMI (Body Mass Index)       76 97.7  F (36.5  C) (Tympanic) 6' 1.2\" (1.859 m) 98% 28.13 kg/m2        Blood Pressure from Last 3 Encounters:   10/15/18 136/80   10/08/18 132/76   08/04/17 121/82    Weight from Last 3 " Encounters:   10/15/18 214 lb 6.4 oz (97.3 kg)   10/08/18 209 lb 3.2 oz (94.9 kg)   08/04/17 211 lb (95.7 kg)              We Performed the Following     Beta strep group A culture     Influenza A/B antigen     Strep, Rapid Screen          Today's Medication Changes          These changes are accurate as of 10/15/18  1:25 PM.  If you have any questions, ask your nurse or doctor.               Start taking these medicines.        Dose/Directions    doxycycline 100 MG capsule   Commonly known as:  VIBRAMYCIN   Used for:  Acute bronchitis, unspecified organism   Started by:  Ranulfo Najera MD        Dose:  100 mg   Take 1 capsule (100 mg) by mouth 2 times daily   Quantity:  20 capsule   Refills:  0            Where to get your medicines      These medications were sent to [id number] - [place], [place] - [address] [place] [address] [address] [place] [address]     Phone:  [phone]     doxycycline 100 MG capsule                Primary Care Provider Office Phone # Fax #    Ranulfo Najera [phone] [address] [facility] [address]        Equal Access to Services     [facility] HospitalSTEFANIE AH: Hadii aad ku hadasho Soomaali, waaxda luqadaha, qaybta kaalmada adeegyada, norma delgado. So [facility] [phone].    ATENCIÓN: Si habla español, tiene a kaur disposición servicios gratuitos de asistencia lingüística. Llame al [phone].    We comply with applicable federal civil rights laws and [place] laws. We do not discriminate on the basis of race, color, national origin, age, disability, sex, sexual orientation, or gender identity.            Thank you!     Thank you for choosing [facility]  for your care. Our goal is always to provide you with excellent care. Hearing back from our patients is one way we can continue to improve our services. Please take a few minutes to complete the written survey that you may  receive in the mail after your visit with us. Thank you!             Your Updated Medication List - Protect others around you: Learn how to safely use, store and throw away your medicines at www.disposemymeds.org.          This list is accurate as of 10/15/18  1:25 PM.  Always use your most recent med list.                   Brand Name Dispense Instructions for use Diagnosis    aspirin 81 MG tablet      1 TABLET DAILY        cinnamon 500 MG Tabs      Take 1 tablet by mouth daily.        Co Q 10 10 MG Caps      Take  by mouth.        doxycycline 100 MG capsule    VIBRAMYCIN    20 capsule    Take 1 capsule (100 mg) by mouth 2 times daily    Acute bronchitis, unspecified organism       FISH OIL PO      Take 1 capsule by mouth daily.        flax seed oil 1000 MG capsule      Take 1 capsule by mouth daily.        FRUIT & VEGETABLE DAILY PO      Take 1 capsule by mouth daily.        Garlic 1000 MG Caps      Take 1 tablet by mouth daily.        ibuprofen 200 MG tablet    ADVIL/MOTRIN     Take 2 tablets by mouth daily as needed.        Multi-vitamin Tabs tablet   Generic drug:  multivitamin, therapeutic with minerals      Take 1 tablet by mouth daily.        NIACIN PO      Take 1 tablet by mouth daily        red yeast rice 600 MG Caps      Take 1 capsule by mouth daily.        Resveratrol 100 MG Caps      Take 1 capsule by mouth daily.        simvastatin 10 MG tablet    ZOCOR    90 tablet    TAKE 1 TABLET(10 MG) BY MOUTH AT BEDTIME    Hyperlipidemia LDL goal <130       UNABLE TO FIND      Take 1 tablet by mouth daily. MEDICATION NAME: L-Anginine & L-Citrulline 500mg        VITAMIN D3 PO      Take 1 tablet by mouth daily.

## 2018-10-16 LAB
BACTERIA SPEC CULT: NORMAL
SPECIMEN SOURCE: NORMAL

## 2018-10-19 ENCOUNTER — HOSPITAL ENCOUNTER (EMERGENCY)
Facility: CLINIC | Age: 48
Discharge: HOME | End: 2018-10-19
Attending: PHYSICIAN ASSISTANT | Admitting: PHYSICIAN ASSISTANT
Payer: COMMERCIAL

## 2018-10-19 DIAGNOSIS — J06.9 VIRAL URI WITH COUGH: ICD-10-CM

## 2018-10-19 DIAGNOSIS — R25.3 FASCICULATIONS OF MUSCLE: ICD-10-CM

## 2018-10-19 PROCEDURE — 99213 OFFICE O/P EST LOW 20 MIN: CPT | Mod: Z6 | Performed by: PHYSICIAN ASSISTANT

## 2018-10-19 PROCEDURE — G0463 HOSPITAL OUTPT CLINIC VISIT: HCPCS | Performed by: PHYSICIAN ASSISTANT

## 2018-10-19 RX ORDER — BENZONATATE 100 MG/1
100 CAPSULE ORAL 3 TIMES DAILY PRN
Qty: 42 CAPSULE | Refills: 0 | Status: SHIPPED | OUTPATIENT
Start: 2018-10-19 | End: 2019-01-17

## 2018-10-19 NOTE — DISCHARGE INSTRUCTIONS
Viral Upper Respiratory Illness (Adult)  You have a viral upper respiratory illness (URI), which is another term for the common cold. This illness is contagious during the first few days. It is spread through the air by coughing and sneezing. It may also be spread by direct contact (touching the sick person and then touching your own eyes, nose, or mouth). Frequent handwashing will decrease risk of spread. Most viral illnesses go away within 7 to 10 days with rest and simple home remedies. Sometimes the illness may last for several weeks. Antibiotics will not kill a virus, and they are generally not prescribed for this condition.    Home care    If symptoms are severe, rest at home for the first 2 to 3 days. When you resume activity, don't let yourself get too tired.    Avoid being exposed to cigarette smoke (yours or others ).    You may use acetaminophen or ibuprofen to control pain and fever, unless another medicine was prescribed. If you have chronic liver or kidney disease, have ever had a stomach ulcer or gastrointestinal bleeding, or are taking blood-thinning medicines, talk with your healthcare provider before using these medicines. Aspirin should never be given to anyone under 18 years of age who is ill with a viral infection or fever. It may cause severe liver or brain damage.    Your appetite may be poor, so a light diet is fine. Avoid dehydration by drinking 6 to 8 glasses of fluids per day (water, soft drinks, juices, tea, or soup). Extra fluids will help loosen secretions in the nose and lungs.    Over-the-counter cold medicines will not shorten the length of time you re sick, but they may be helpful for the following symptoms: cough, sore throat, and nasal and sinus congestion. (Note: Do not use decongestants if you have high blood pressure.)  Follow-up care  Follow up with your healthcare provider, or as advised.  When to seek medical advice  Call your healthcare provider right away if any of these  occur:    Cough with lots of colored sputum (mucus)    Severe headache; face, neck, or ear pain    Difficulty swallowing due to throat pain    Fever of 100.4 F (38 C) or higher, or as directed by your healthcare provider  Call 911  Call 911 if any of these occur:    Chest pain, shortness of breath, wheezing, or difficulty breathing    Coughing up blood    Inability to swallow due to throat pain  Date Last Reviewed: 9/13/2015 2000-2017 [employer]. [address] [place] [address]. All rights reserved. This information is not intended as a substitute for professional medical care. Always follow your healthcare professional's instructions.

## 2018-10-19 NOTE — ED PROVIDER NOTES
History     Chief Complaint   Patient presents with     URI     having twitching in face that concerned him, on doxycycline     HPI  Fercho Phillips is a 48 year old male who presents to the urgent care with concern over possible muscle abnormality on the left side of his face is been present for approximately the last 2 weeks.  Patient initially described a twitching, sensation near his left cheek.  He did have URI symptoms including sore throat, nasal congestion, cough at onset and was evaluated by his primary care provider who performed negative rapid strep test and blood work including CMP.  Cough continued to worsen and patient became concerned that he closed his left eye and smiled he did not see significant muscle movement on the base of his neck like he would on the right.  He also states concern that there appears to be indent on his left cheek.  He was evaluated in the primary care clinic five days ago and diagnosed with bronchitis. He states he did bring concern over facial movement up with provider who recommended deferring further evaluation until cough had cleared.  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      Problem List:    Patient Active Problem List    Diagnosis Date Noted     Elevated LFTs 04/18/2012     Priority: Medium     Abnormality of gait 12/12/2011     Priority: Medium     Achilles tendon tear 11/30/2011     Priority: Medium     HYPERLIPIDEMIA LDL GOAL <130 10/31/2010     Priority: Medium     Health Care Home 06/27/2013     Priority: Low     EMERGENCY CARE PLAN  June 27, 2013: No current Care Coordination follow up planned. Please refer if Care Coordination services are needed.    Presenting Problem Signs and Symptoms Treatment Plan   Questions or concerns   during clinic hours   I will call my clinic directly:  [facility]  [address], [place] [address] [phone].    Questions or  concerns outside clinic hours   I will call the 24 hour nurse line at   [phone] or [phone].   Need to schedule an appointment   I will call the 24 hour scheduling team at [phone] or my clinic directly at [phone].    Same day treatment     I will call my clinic first, nurse line if after hours, urgent care and express care if needed.   Clinic care coordination services (regular clinic hours)     I will call a clinic care coordinator directly:     Josue Rivera RN  Mon, Tues, Fri - [phone]  Wed, Thurs - [phone]    Ida Hood, :    [phone]    Or call my clinic at [phone] and ask to speak with care coordination.   Crisis Services: Behavioral or Mental Health  [facility] 24 Hour Phone Line  [phone]    [facility] 24 Hour Crisis Services  [phone]    [facility]Behavioral Health Providers[facility] [phone]    [facility]   [phone]       Emergency treatment -- Immediately    CAll 871             Past Medical History:    History reviewed. No pertinent past medical history.    Past Surgical History:    Past Surgical History:   Procedure Laterality Date     COLONOSCOPY N/A 1/11/2016    Procedure: COLONOSCOPY;  Surgeon: Kumar Nunez MD;  Location: [facility]     Left shoulder scope      Left shoulder scope     Right elbow repair      Right elbow repair     Right thumb repair      Right thumb repair       Family History:    Family History   Problem Relation Age of Onset     HEART DISEASE Father      heart disease, angioplasty with stent     Cancer - colorectal Father      C.A.D. Father      Breast Cancer Mother        Social History:  Marital Status:   [2]  Social History   Substance Use Topics     Smoking status: Never Smoker     Smokeless tobacco: Never Used     Alcohol use Yes      Comment: 2-3 beers per week      Medications:      benzonatate (TESSALON) 100 MG capsule   ASPIRIN 81 MG OR TABS   Cholecalciferol (VITAMIN D3 PO)   cinnamon  500 MG TABS   Coenzyme Q10 (CO Q 10) 10 MG CAPS   doxycycline (VIBRAMYCIN) 100 MG capsule   Flaxseed, Linseed, (FLAX SEED OIL) 1000 MG capsule   Garlic 1000 MG CAPS   ibuprofen (ADVIL,MOTRIN) 200 MG tablet   Multiple Vitamin (MULTI-VITAMIN) per tablet   NIACIN PO   Nutritional Supplements (FRUIT & VEGETABLE DAILY PO)   Omega-3 Fatty Acids (FISH OIL PO)   red yeast rice 600 MG CAPS   Resveratrol 100 MG CAPS   simvastatin (ZOCOR) 10 MG tablet   UNABLE TO FIND         Review of Systems  CONSTITUTIONAL:NEGATIVE for fever, chills, change in weight  INTEGUMENTARY/SKIN: NEGATIVE for worrisome rashes, moles or lesions  EYES: NEGATIVE for vision changes, redness or discharge   ENT/MOUTH: POSITIVE for irregular muscle movements on left side of face resolving nasal congestion, sore throat and NEGATIVE for ear pain   RESP:POSITIVE for cough and NEGATIVE for SOB/dyspnea and wheezing  GI: NEGATIVE for abdominal pain, diarrhea, nausea and vomiting  NEURO: POSITIVE for involuntary movements on left side of face and NEGATIVE for headache, dizziness, lightheadedness, speech difficulty   Physical Exam   BP: (!) 144/105  Pulse: 72  Temp: 97.9  F (36.6  C)  Weight: 93 kg (205 lb)  SpO2: 96 %  Physical Exam  GENERAL APPEARANCE: healthy, alert and no distress  EYES: EOMI,  PERRL, conjunctiva clear  HENT: ear canals and TM's normal.  Nose and mouth without ulcers, erythema or lesions  NECK: supple, nontender, no lymphadenopathy  RESP: lungs clear to auscultation - no rales, rhonchi or wheezes  CV: regular rates and rhythm, normal S1 S2, no murmur noted  ABDOMEN:  soft, nontender, no HSM or masses and bowel sounds normal  NEURO: Normal strength and tone, sensory exam grossly normal,  normal speech and mentation, CN II-XII grossly intact   SKIN: no suspicious lesions or rashes  ED Course     ED Course     Procedures        Critical Care time:  none          Visual acuity was 20/30 in each eye   No results found for this or any previous visit  (from the past 24 hour(s)).    Medications - No data to display    Assessments & Plan (with Medical Decision Making)     I have reviewed the nursing notes.    I have reviewed the findings, diagnosis, plan and need for follow up with the patient.       Discharge Medication List as of 10/19/2018  5:02 PM      START taking these medications    Details   benzonatate (TESSALON) 100 MG capsule Take 1 capsule (100 mg) by mouth 3 times daily as needed for cough, Disp-42 capsule, R-0, E-Prescribe           Final diagnoses:   Viral URI with cough   Fasciculations of muscle     48-year-old male presents to urgent care with concern over irregular movements of muscle on the left side of his face and ongoing URI symptoms recently diagnosed as bronchitis and treated with doxycycline.  Patient had stable vital signs upon arrival.  Physical exam findings as described above were essentially benign.  There was no noted muscle movements at this time and face was symmetrical without evidence of unilateral weakness to suggest TIA/stroke migraine variant.  I suspect patient is having muscle fasciculations secondary to his stated fatigue.  He was discharged home stable with instructions for symptomatic treatment with rest, prescriptions for Tessalon given to control cough. follow-up with primary care provider if no improvement within the next 5-7 days.  Worrisome reasons to return to the ER/UC sooner discussed.    Disclaimer: This note consists of symbols derived from keyboarding, dictation, and/or voice recognition software. As a result, there may be errors in the script that have gone undetected.  Please consider this when interpreting information found in the chart.        10/19/2018   [facility] EMERGENCY DEPARTMENT     Shelley Wasserman PA-C  10/20/18 [id number]

## 2018-10-19 NOTE — ED TRIAGE NOTES
Patient presents today with spasms on left side of check, and patient reports that its indented . Symptoms started two weeks ago . Arrived to urgent care ambulatory . Patient is currently on antibiotics for URI.

## 2018-10-19 NOTE — ED AVS SNAPSHOT
[facility] Emergency Department    [address] [place] [address]    Phone:  [phone]    Fax:  [phone]                                       Fercho Phillips   MRN: [id number]    Department:  [facility] Emergency Department   Date of Visit:  10/19/2018           Patient Information     Date Of Birth          1970        Your diagnoses for this visit were:     Viral URI with cough     Fasciculations of muscle        You were seen by Shelley Wasserman PA-C.      Follow-up Information     Follow up with Ranulfo Najera MD In 7 days.    Specialty:  Family Practice    Why:  if no improvement or sooner if new or worsening symptoms     Contact information:    [address] [facility] [address]  [phone]          Discharge Instructions         Viral Upper Respiratory Illness (Adult)  You have a viral upper respiratory illness (URI), which is another term for the common cold. This illness is contagious during the first few days. It is spread through the air by coughing and sneezing. It may also be spread by direct contact (touching the sick person and then touching your own eyes, nose, or mouth). Frequent handwashing will decrease risk of spread. Most viral illnesses go away within 7 to 10 days with rest and simple home remedies. Sometimes the illness may last for several weeks. Antibiotics will not kill a virus, and they are generally not prescribed for this condition.    Home care    If symptoms are severe, rest at home for the first 2 to 3 days. When you resume activity, don't let yourself get too tired.    Avoid being exposed to cigarette smoke (yours or others ).    You may use acetaminophen or ibuprofen to control pain and fever, unless another medicine was prescribed. If you have chronic liver or kidney disease, have ever had a stomach ulcer or gastrointestinal bleeding, or are taking blood-thinning medicines, talk with your healthcare provider before using these medicines.  Aspirin should never be given to anyone under 18 years of age who is ill with a viral infection or fever. It may cause severe liver or brain damage.    Your appetite may be poor, so a light diet is fine. Avoid dehydration by drinking 6 to 8 glasses of fluids per day (water, soft drinks, juices, tea, or soup). Extra fluids will help loosen secretions in the nose and lungs.    Over-the-counter cold medicines will not shorten the length of time you re sick, but they may be helpful for the following symptoms: cough, sore throat, and nasal and sinus congestion. (Note: Do not use decongestants if you have high blood pressure.)  Follow-up care  Follow up with your healthcare provider, or as advised.  When to seek medical advice  Call your healthcare provider right away if any of these occur:    Cough with lots of colored sputum (mucus)    Severe headache; face, neck, or ear pain    Difficulty swallowing due to throat pain    Fever of 100.4 F (38 C) or higher, or as directed by your healthcare provider  Call 911  Call 911 if any of these occur:    Chest pain, shortness of breath, wheezing, or difficulty breathing    Coughing up blood    Inability to swallow due to throat pain  Date Last Reviewed: 9/13/2015 2000-2017 [employer]. [address] [place] [address]. All rights reserved. This information is not intended as a substitute for professional medical care. Always follow your healthcare professional's instructions.          Your next 10 appointments already scheduled     Oct 22, 2018  5:40 PM CDT   SHORT with Ranulfo Najera MD   [facility] ([facility]    [address] [address] [phone]              24 Hour Appointment Hotline       To make an appointment at any [facility], call [phone] ([phone]). If you don't have a family doctor or clinic, we will help you find one. [facility] are conveniently located to serve the needs  of you and your family.             Review of your medicines      START taking        Dose / Directions Last dose taken    benzonatate 100 MG capsule   Commonly known as:  TESSALON   Dose:  100 mg   Quantity:  42 capsule        Take 1 capsule (100 mg) by mouth 3 times daily as needed for cough   Refills:  0          Our records show that you are taking the medicines listed below. If these are incorrect, please call your family doctor or clinic.        Dose / Directions Last dose taken    aspirin 81 MG tablet        1 TABLET DAILY   Refills:  0        cinnamon 500 MG Tabs   Dose:  1 tablet        Take 1 tablet by mouth daily.   Refills:  0        Co Q 10 10 MG Caps        Take  by mouth.   Refills:  0        doxycycline 100 MG capsule   Commonly known as:  VIBRAMYCIN   Dose:  100 mg   Quantity:  20 capsule        Take 1 capsule (100 mg) by mouth 2 times daily   Refills:  0        FISH OIL PO   Dose:  1 capsule        Take 1 capsule by mouth daily.   Refills:  0        flax seed oil 1000 MG capsule   Dose:  1 capsule        Take 1 capsule by mouth daily.   Refills:  0        FRUIT & VEGETABLE DAILY PO   Dose:  1 capsule        Take 1 capsule by mouth daily.   Refills:  0        Garlic 1000 MG Caps   Dose:  1 tablet        Take 1 tablet by mouth daily.   Refills:  0        ibuprofen 200 MG tablet   Commonly known as:  ADVIL/MOTRIN   Dose:  2 tablet        Take 2 tablets by mouth daily as needed.   Refills:  0        Multi-vitamin Tabs tablet   Dose:  1 tablet   Generic drug:  multivitamin, therapeutic with minerals        Take 1 tablet by mouth daily.   Refills:  0        NIACIN PO   Dose:  1 tablet        Take 1 tablet by mouth daily   Refills:  0        red yeast rice 600 MG Caps   Dose:  1 capsule        Take 1 capsule by mouth daily.   Refills:  0        Resveratrol 100 MG Caps   Dose:  1 capsule        Take 1 capsule by mouth daily.   Refills:  0        simvastatin 10 MG tablet   Commonly known as:  ZOCOR    Quantity:  90 tablet        TAKE 1 TABLET(10 MG) BY MOUTH AT BEDTIME   Refills:  0        UNABLE TO FIND   Dose:  1 tablet        Take 1 tablet by mouth daily. MEDICATION NAME: L-Anginine & L-Citrulline 500mg   Refills:  0        VITAMIN D3 PO   Dose:  1 tablet        Take 1 tablet by mouth daily.   Refills:  0                Prescriptions were sent or printed at these locations (1 Prescription)                   [facility] [place], [place] - [address]   [address] [address]    Telephone:  [phone]   Fax:  [phone]   Hours:                  E-Prescribed (1 of 1)         benzonatate (TESSALON) 100 MG capsule                Orders Needing Specimen Collection     None      Pending Results     No orders found from 10/17/2018 to 10/20/2018.            Pending Culture Results     No orders found from 10/17/2018 to 10/20/2018.            Pending Results Instructions     If you had any lab results that were not finalized at the time of your Discharge, you can call the ED Lab Result RN at [phone]. You will be contacted by this team for any positive Lab results or changes in treatment. The nurses are available 7 days a week from 10A to 6:30P.  You can leave a message 24 hours per day and they will return your call.        Test Results From Your Hospital Stay               Thank you for choosing [facility]       Thank you for choosing [facility] for your care. Our goal is always to provide you with excellent care. Hearing back from our patients is one way we can continue to improve our services. Please take a few minutes to complete the written survey that you may receive in the mail after you visit with us. Thank you!        [employer] Information     [employer] gives you secure access to your electronic health record. If you see a primary care provider, you can also send messages to your care team and make appointments. If you have questions, please call your primary care clinic.  If  you do not have a primary care provider, please call [phone] and they will assist you.        Care EveryWhere ID     This is your Care EveryWhere ID. This could be used by other organizations to access your [facility] medical records  [id number]        Equal Access to Services     PAZ OLMEDO : Clari Soriano, bryce vasquez, jose snider, norma delgado. So [facility] [phone].    ATENCIÓN: Si habla español, tiene a kaur disposición servicios gratuitos de asistencia lingüística. Llame al [phone].    We comply with applicable federal civil rights laws and [place] laws. We do not discriminate on the basis of race, color, national origin, age, disability, sex, sexual orientation, or gender identity.            After Visit Summary       This is your record. Keep this with you and show to your community pharmacist(s) and doctor(s) at your next visit.

## 2018-10-19 NOTE — ED AVS SNAPSHOT
[facility] Emergency Department    [address] [address]    Phone:  [phone]    Fax:  [phone]                                       Fercho Phillips   MRN: [id number]    Department:  [facility] Emergency Department   Date of Visit:  10/19/2018           After Visit Summary Signature Page     I have received my discharge instructions, and my questions have been answered. I have discussed any challenges I see with this plan with the nurse or doctor.    ..........................................................................................................................................  Patient/Patient Representative Signature      ..........................................................................................................................................  Patient Representative Print Name and Relationship to Patient    ..................................................               ................................................  Date                                   Time    ..........................................................................................................................................  Reviewed by Signature/Title    ...................................................              ..............................................  Date                                               Time          22EPIC Rev 08/18

## 2018-10-20 VITALS
SYSTOLIC BLOOD PRESSURE: 144 MMHG | BODY MASS INDEX: 26.9 KG/M2 | OXYGEN SATURATION: 96 % | WEIGHT: 205 LBS | DIASTOLIC BLOOD PRESSURE: 105 MMHG | TEMPERATURE: 97.9 F | HEART RATE: 72 BPM

## 2018-10-24 DIAGNOSIS — E78.5 HYPERLIPIDEMIA LDL GOAL <130: ICD-10-CM

## 2018-10-24 RX ORDER — SIMVASTATIN 10 MG
TABLET ORAL
Qty: 90 TABLET | Refills: 3 | Status: SHIPPED | OUTPATIENT
Start: 2018-10-24 | End: 2019-01-17

## 2018-10-24 NOTE — TELEPHONE ENCOUNTER
Prescription approved per [facility] [place].  Grady Lr RN

## 2018-10-24 NOTE — TELEPHONE ENCOUNTER
"Requested Prescriptions   Pending Prescriptions Disp Refills     simvastatin (ZOCOR) 10 MG tablet 90 tablet 0     Sig: TAKE 1 TABLET(10 MG) BY MOUTH AT BEDTIME    [facility] Protocol Passed    10/24/2018  2:54 PM       Passed - LDL on file in past 12 months    Recent Labs   Lab Test  10/08/18   1217   LDL  102*            Passed - No abnormal creatine kinase in past 12 months    No lab results found.            Passed - Recent (12 mo) or future (30 days) visit within the authorizing provider's specialty    Patient had office visit in the last 12 months or has a visit in the next 30 days with authorizing provider or within the authorizing provider's specialty.  See \"Patient Info\" tab in inbasket, or \"Choose Columns\" in Meds & Orders section of the refill encounter.             Passed - Patient is age 18 or older       Last Written Prescription Date:  10/8/18  Last Fill Quantity: 90,  # refills: 0   Last office visit: 10/15/2018 with prescribing provider:     Future Office Visit:      "

## 2018-10-24 NOTE — TELEPHONE ENCOUNTER
Pt calling, pharmacy states they do not have current script. Pt was just in for an appointment. He thought it would be called in. Please fill.

## 2019-01-10 ENCOUNTER — TRANSFERRED RECORDS (OUTPATIENT)
Dept: HEALTH INFORMATION MANAGEMENT | Facility: CLINIC | Age: 49
End: 2019-01-10

## 2019-01-17 ENCOUNTER — OFFICE VISIT (OUTPATIENT)
Dept: FAMILY MEDICINE | Facility: CLINIC | Age: 49
End: 2019-01-17
Payer: COMMERCIAL

## 2019-01-17 VITALS
DIASTOLIC BLOOD PRESSURE: 82 MMHG | HEIGHT: 73 IN | BODY MASS INDEX: 27.99 KG/M2 | SYSTOLIC BLOOD PRESSURE: 129 MMHG | WEIGHT: 211.2 LBS | TEMPERATURE: 97.7 F | HEART RATE: 76 BPM

## 2019-01-17 DIAGNOSIS — E78.5 HYPERLIPIDEMIA LDL GOAL <70: Primary | ICD-10-CM

## 2019-01-17 PROCEDURE — 99213 OFFICE O/P EST LOW 20 MIN: CPT | Performed by: FAMILY MEDICINE

## 2019-01-17 RX ORDER — ATORVASTATIN CALCIUM 40 MG/1
40 TABLET, FILM COATED ORAL DAILY
Qty: 90 TABLET | Refills: 3 | Status: SHIPPED | OUTPATIENT
Start: 2019-01-17 | End: 2019-03-01

## 2019-01-17 ASSESSMENT — MIFFLIN-ST. JEOR: SCORE: 1885.05

## 2019-01-17 ASSESSMENT — PAIN SCALES - GENERAL: PAINLEVEL: NO PAIN (0)

## 2019-01-17 NOTE — PROGRESS NOTES
"SUBJECTIVE:                                                    Fercho Phillips is a 48 year old male who presents to clinic today for the following health issues:    Chief Complaint   Patient presents with     Results     **He is here today to discuss his results from his recent heart scan.     **The testing was done at [facility] and he mckeon the results with him.     Problem list and histories reviewed & adjusted, as indicated.  Additional history:     Patient Active Problem List   Diagnosis     HYPERLIPIDEMIA LDL GOAL <130     Achilles tendon tear     Abnormality of gait     Elevated LFTs     Health Care Home     Past Surgical History:   Procedure Laterality Date     COLONOSCOPY N/A 1/11/2016    Procedure: COLONOSCOPY;  Surgeon: Kumar Nunez MD;  Location: [facility]     Left shoulder scope      Left shoulder scope     Right elbow repair      Right elbow repair     Right thumb repair      Right thumb repair       Social History     Tobacco Use     Smoking status: Never Smoker     Smokeless tobacco: Never Used   Substance Use Topics     Alcohol use: Yes     Comment: 2-3 beers per week     Family History   Problem Relation Age of Onset     Heart Disease Father         heart disease, angioplasty with stent     Cancer - colorectal Father      C.A.D. Father      Breast Cancer Mother          HAd coronary ct scan which put him at 91%.  He is here to discuss the resuls.   ROS:  Constitutional, HEENT, cardiovascular, pulmonary, gi and gu systems are negative, except as otherwise noted.    OBJECTIVE:                                                    /82 (BP Location: Right arm, Patient Position: Sitting, Cuff Size: Adult Large)   Pulse 76   Temp 97.7  F (36.5  C) (Tympanic)   Ht 1.859 m (6' 1.2\")   Wt 95.8 kg (211 lb 3.2 oz)   BMI 27.71 kg/m   Body mass index is 27.71 kg/m .   GENERAL: healthy, alert, well nourished, well hydrated, no distress  HENT: ear canals- normal; TMs- normal; Nose- normal; Mouth- no " ulcers, no lesions  NECK: no tenderness, no adenopathy, no asymmetry, no masses, no stiffness; thyroid- normal to palpation  RESP: lungs clear to auscultation - no rales, no rhonchi, no wheezes  CV: regular rates and rhythm, normal S1 S2, no S3 or S4 and no murmur, no click or rub -  ABDOMEN: soft, no tenderness, no  hepatosplenomegaly, no masses, normal bowel sounds       ASSESSMENT/PLAN:                                                      (E78.5) Hyperlipidemia LDL goal <70  (primary encounter diagnosis)  With elevaed calcium score on ct scan.  Discussed higher dose statin  Change to 40 mg lipior.  And f/u wi cardiology  His father had heart disease before age 40 so I think high dose statin is in order.    Plan: atorvastatin (LIPITOR) 40 MG tablet, CARDIOLOGY        EVAL ADULT REFERRAL           reports that  has never smoked. he has never used smokeless tobacco.    [facility]

## 2019-03-01 ENCOUNTER — HOSPITAL ENCOUNTER (OUTPATIENT)
Dept: CARDIOLOGY | Facility: CLINIC | Age: 49
Discharge: HOME | End: 2019-03-01
Attending: INTERNAL MEDICINE | Admitting: INTERNAL MEDICINE
Payer: COMMERCIAL

## 2019-03-01 ENCOUNTER — OFFICE VISIT (OUTPATIENT)
Dept: CARDIOLOGY | Facility: CLINIC | Age: 49
End: 2019-03-01
Payer: COMMERCIAL

## 2019-03-01 VITALS
DIASTOLIC BLOOD PRESSURE: 83 MMHG | BODY MASS INDEX: 27.16 KG/M2 | HEART RATE: 75 BPM | SYSTOLIC BLOOD PRESSURE: 139 MMHG | OXYGEN SATURATION: 99 % | WEIGHT: 207 LBS

## 2019-03-01 DIAGNOSIS — Z82.49 FAMILY HISTORY OF HEART DISEASE: Primary | ICD-10-CM

## 2019-03-01 DIAGNOSIS — E78.5 HYPERLIPIDEMIA LDL GOAL <100: ICD-10-CM

## 2019-03-01 DIAGNOSIS — Z82.49 FAMILY HISTORY OF HEART DISEASE: ICD-10-CM

## 2019-03-01 DIAGNOSIS — R93.1 ELEVATED CORONARY ARTERY CALCIUM SCORE: ICD-10-CM

## 2019-03-01 PROCEDURE — 93005 ELECTROCARDIOGRAM TRACING: CPT

## 2019-03-01 PROCEDURE — 99204 OFFICE O/P NEW MOD 45 MIN: CPT | Performed by: INTERNAL MEDICINE

## 2019-03-01 PROCEDURE — 93010 ELECTROCARDIOGRAM REPORT: CPT | Performed by: INTERNAL MEDICINE

## 2019-03-01 RX ORDER — ATORVASTATIN CALCIUM 20 MG/1
20 TABLET, FILM COATED ORAL DAILY
Qty: 90 TABLET | Refills: 3 | Status: SHIPPED | OUTPATIENT
Start: 2019-03-01 | End: 2020-03-26

## 2019-03-01 RX ORDER — SIMVASTATIN 10 MG
10 TABLET ORAL AT BEDTIME
COMMUNITY
End: 2019-03-01

## 2019-03-01 NOTE — LETTER
3/1/2019    Ranulfo Najera MD  [address] [facility] [address]    RE: Fercho CARPENTER Alan       Dear Colleague,    I had the pleasure of seeing Fercho PAULINE Phillips in the [facility].        Cardiology Consultation     Assessment & Plan      1.  Elevated calcium score  2.  Strong family history of early premature coronary artery disease  3.  Orthopedic injuries  4.  Normal EKG    Recommendations    1.  Went over recent cardiac testing from outside facility  2.  Given elevated calcium score we will get a stress echocardiogram for baseline and to assess for any significant obstructive disease  3.  We will switch him over to Lipitor 20 mg daily  4.  Recheck cholesterol in 3 months time  5.  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  He presents from a primary preventive perspective.  He states that his father had a PCI performed at the age of 36.  He is also had a bypass in his 60s.  He has a twin brother who also has hyperlipidemia which runs in the family.  He does not have any specific cardiac complaints however due to early premature coronary artery disease in his family he underwent a calcium score at an outside facility.  This demonstrated a calcium score of 171.  As a result he presents to establish local cardiac care.  Cholesterol was checked and his LDL was elevated.  He has been on simvastatin for many years however it was advised that he should switch over to Lipitor due to not meeting target goals.  Patient had an EKG performed demonstrating normal sinus rhythm.  He does not have any other additional medical issues with the exception of orthopedic injuries in the past.  Lifelong non-smoker      Stephane Baker MD    Primary Care Physician   Ranulfo Najera      Patient Active Problem List   Diagnosis      HYPERLIPIDEMIA LDL GOAL <130     Achilles tendon tear     Abnormality of gait     Elevated LFTs     Health Care Home       Past Medical History   I have reviewed this patient's medical history and updated it with pertinent information if needed.   No past medical history on file.    Past Surgical History   I have reviewed this patient's surgical history and updated it with pertinent information if needed.  Past Surgical History:   Procedure Laterality Date     COLONOSCOPY N/A 1/11/2016    Procedure: COLONOSCOPY;  Surgeon: Kumar Nunez MD;  Location: [facility]     Left shoulder scope      Left shoulder scope     Right elbow repair      Right elbow repair     Right thumb repair      Right thumb repair       Prior to Admission Medications   Cannot display prior to admission medications because the patient has not been admitted in this contact.     [unfilled]  [unfilled]  Allergies   No Known Allergies    Social History    reports that  has never smoked. he has never used smokeless tobacco. He reports that he drinks alcohol. He reports that he does not use drugs.    Family History   Family History   Problem Relation Age of Onset     Heart Disease Father         heart disease, angioplasty with stent     Cancer - colorectal Father      C.A.D. Father      Breast Cancer Mother        Review of Systems   The comprehensive 10 point Review of Systems is negative other than noted in the HPI or here.     Physical Exam   Vital Signs with Ranges  Pulse:  [75] 75  BP: (139)/(83) 139/83  SpO2:  [99 %] 99 %  Wt Readings from Last 4 Encounters:   03/01/19 93.9 kg (207 lb)   01/17/19 95.8 kg (211 lb 3.2 oz)   10/19/18 93 kg (205 lb)   10/15/18 97.3 kg (214 lb 6.4 oz)     [unfilled]      Vitals: /83   Pulse 75   Wt 93.9 kg (207 lb)   SpO2 99%   BMI 27.16 kg/m       Constitutional:   awake, alert, cooperative, no apparent distress, and appears stated age     ENT:   Normocephalic, without obvious abnormality, atraumatic,  sinuses nontender on palpation, external ears without lesions, oral pharynx with moist mucous membranes, tonsils without erythema or exudates, gums normal and good dentition.     Neck:   Supple, symmetrical, trachea midline, no adenopathy, thyroid symmetric, not enlarged and no tenderness, skin normal     Back:   Symmetric, no curvature, spinous processes are non-tender on palpation, paraspinous muscles are non-tender on palpation, no costal vertebral tenderness     Lungs:   No increased work of breathing, good air exchange, clear to auscultation bilaterally, no crackles or wheezing     Cardiovascular:   Normal apical impulse, regular rate and rhythm, normal S1 and S2, no S3 or S4, and no murmur noted     Abdomen:   No scars, normal bowel sounds, soft, non-distended, non-tender, no masses palpated, no hepatosplenomegally     Musculoskeletal:   There is no redness, warmth, or swelling of the joints.  Full range of motion noted.  Motor strength is 5 out of 5 all extremities bilaterally.  Tone is normal.     Neurologic:   Awake, alert, oriented to name, place and time.  Cranial nerves II-XII are grossly intact.  Motor is 5 out of 5 bilaterally.  Cerebellar finger to nose, heel to shin intact.  Sensory is intact.  Babinski down going, Romberg negative, and gait is normal.       @LABRCNTIPR(tropi:5,troponinies:5)@    @LABRCNTIPR(wbc:3,hgb:3,mcv:3,plt:3,inr:3,na:3,potassium:3,chloride:3,co2:3,bun:3,cr:3,gfrestimated:3,gfrestblack:3,aniongap:3,skyla:3,glc:3,albumin:2,prottotal:2,bilitotal:2,alkphos:2,alt:2,ast:2,lipase:2,tropi:3)@  Recent Labs   Lab Test 10/08/18  1217 09/15/16  0933  11/06/14  0945 06/13/13  0937   CHOL 159 155   < > 168 153   HDL 43 44   < > 53 39*   * 90   < > 97 98   TRIG 72 104   < > 91 82   CHOLHDLRATIO  --   --   --  3.2 4.0    < > = values in this interval not displayed.      @LABRCNTIP(wbc:3,hgb:3,hct:3,mcv:3,plt:3,iron:3,ironsat:3,reticabsct:3,retp:3,feb:3,lionel:3,b12:3,folic:3,epoe:3,morph:3)@  @LABRCNTIP(PH:3,PHV:3,PO2:3,PO2V:3,sat:3,PCO2:3,PCO2V:3,HCO3:3,HCO3V:3)@  @LABRCNTIP(NTBNPI:3,NTBNP:3)@  @LABRCNTIP(DD:1)@  @LABRCNTIP(sed:3,crp:3)@  @LABRCNTIP(PLT:3)@  @LABRCNTIP(TSH:3)@  @LABRCNTIP(color:1,appearance:1,urineg,urinebili:1,urineketone:1,s,ubld:1,urineph:1,protein:1,urobilinogen:1,nitrite:1,leukest:1,rbcu:1,wbcu:1)@    Imaging:  No results found for this or any previous visit (from the past 48 hour(s)).    Echo:  No results found for this or any previous visit (from the past 4320 hour(s)).             Thank you for allowing me to participate in the care of your patient.      Sincerely,     Stephane Baker MD     [facility]    cc:   Ranulfo Najera [place]  [address] [place] [address]

## 2019-03-01 NOTE — PROGRESS NOTES
Cardiology Consultation     Assessment & Plan      1.  Elevated calcium score  2.  Strong family history of early premature coronary artery disease  3.  Orthopedic injuries  4.  Normal EKG    Recommendations    1.  Went over recent cardiac testing from outside facility  2.  Given elevated calcium score we will get a stress echocardiogram for baseline and to assess for any significant obstructive disease  3.  We will switch him over to Lipitor 20 mg daily  4.  Recheck cholesterol in 3 months time  5.  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  He presents from a primary preventive perspective.  He states that his father had a PCI performed at the age of 36.  He is also had a bypass in his 60s.  He has a twin brother who also has hyperlipidemia which runs in the family.  He does not have any specific cardiac complaints however due to early premature coronary artery disease in his family he underwent a calcium score at an outside facility.  This demonstrated a calcium score of 171.  As a result he presents to establish local cardiac care.  Cholesterol was checked and his LDL was elevated.  He has been on simvastatin for many years however it was advised that he should switch over to Lipitor due to not meeting target goals.  Patient had an EKG performed demonstrating normal sinus rhythm.  He does not have any other additional medical issues with the exception of orthopedic injuries in the past.  Lifelong non-smoker      Stephane Baker MD    Primary Care Physician   Ranulfo Najera      Patient Active Problem List   Diagnosis     HYPERLIPIDEMIA LDL GOAL <130     Achilles tendon tear     Abnormality of gait     Elevated LFTs     Health Care Home       Past Medical History   I have reviewed this patient's medical history and updated it  with pertinent information if needed.   No past medical history on file.    Past Surgical History   I have reviewed this patient's surgical history and updated it with pertinent information if needed.  Past Surgical History:   Procedure Laterality Date     COLONOSCOPY N/A 1/11/2016    Procedure: COLONOSCOPY;  Surgeon: Kumar Nunez MD;  Location: [facility]     Left shoulder scope      Left shoulder scope     Right elbow repair      Right elbow repair     Right thumb repair      Right thumb repair       Prior to Admission Medications   Cannot display prior to admission medications because the patient has not been admitted in this contact.     [unfilled]  [unfilled]  Allergies   No Known Allergies    Social History    reports that  has never smoked. he has never used smokeless tobacco. He reports that he drinks alcohol. He reports that he does not use drugs.    Family History   Family History   Problem Relation Age of Onset     Heart Disease Father         heart disease, angioplasty with stent     Cancer - colorectal Father      C.A.D. Father      Breast Cancer Mother        Review of Systems   The comprehensive 10 point Review of Systems is negative other than noted in the HPI or here.     Physical Exam   Vital Signs with Ranges  Pulse:  [75] 75  BP: (139)/(83) 139/83  SpO2:  [99 %] 99 %  Wt Readings from Last 4 Encounters:   03/01/19 93.9 kg (207 lb)   01/17/19 95.8 kg (211 lb 3.2 oz)   10/19/18 93 kg (205 lb)   10/15/18 97.3 kg (214 lb 6.4 oz)     [unfilled]      Vitals: /83   Pulse 75   Wt 93.9 kg (207 lb)   SpO2 99%   BMI 27.16 kg/m      Constitutional:   awake, alert, cooperative, no apparent distress, and appears stated age     ENT:   Normocephalic, without obvious abnormality, atraumatic, sinuses nontender on palpation, external ears without lesions, oral pharynx with moist mucous membranes, tonsils without erythema or exudates, gums normal and good dentition.     Neck:   Supple, symmetrical,  trachea midline, no adenopathy, thyroid symmetric, not enlarged and no tenderness, skin normal     Back:   Symmetric, no curvature, spinous processes are non-tender on palpation, paraspinous muscles are non-tender on palpation, no costal vertebral tenderness     Lungs:   No increased work of breathing, good air exchange, clear to auscultation bilaterally, no crackles or wheezing     Cardiovascular:   Normal apical impulse, regular rate and rhythm, normal S1 and S2, no S3 or S4, and no murmur noted     Abdomen:   No scars, normal bowel sounds, soft, non-distended, non-tender, no masses palpated, no hepatosplenomegally     Musculoskeletal:   There is no redness, warmth, or swelling of the joints.  Full range of motion noted.  Motor strength is 5 out of 5 all extremities bilaterally.  Tone is normal.     Neurologic:   Awake, alert, oriented to name, place and time.  Cranial nerves II-XII are grossly intact.  Motor is 5 out of 5 bilaterally.  Cerebellar finger to nose, heel to shin intact.  Sensory is intact.  Babinski down going, Romberg negative, and gait is normal.       @LABRCNTIPR(tropi:5,troponinies:5)@    @LABRCNTIPR(wbc:3,hgb:3,mcv:3,plt:3,inr:3,na:3,potassium:3,chloride:3,co2:3,bun:3,cr:3,gfrestimated:3,gfrestblack:3,aniongap:3,skyla:3,glc:3,albumin:2,prottotal:2,bilitotal:2,alkphos:2,alt:2,ast:2,lipase:2,tropi:3)@  Recent Labs   Lab Test 10/08/18  1217 09/15/16  0933  11/06/14  0945 06/13/13  0937   CHOL 159 155   < > 168 153   HDL 43 44   < > 53 39*   * 90   < > 97 98   TRIG 72 104   < > 91 82   CHOLHDLRATIO  --   --   --  3.2 4.0    < > = values in this interval not displayed.      @LABRCNTIP(wbc:3,hgb:3,hct:3,mcv:3,plt:3,iron:3,ironsat:3,reticabsct:3,retp:3,feb:3,lionel:3,b12:3,folic:3,epoe:3,morph:3)@  @LABRCNTIP(PH:3,PHV:3,PO2:3,PO2V:3,sat:3,PCO2:3,PCO2V:3,HCO3:3,HCO3V:3)@  @LABRCNTIP(NTBNPI:3,NTBNP:3)@  @LABRCNTIP(DD:1)@  @LABRCNTIP(sed:3,crp:3)@  @LABRCNTIP(PLT:3)@  @LABRCNTIP(TSH:3)@  @LABRCNTIP(color:1,appearance:1,urineg,urinebili:1,urineketone:1,s,ubld:1,urineph:1,protein:1,urobilinogen:1,nitrite:1,leukest:1,rbcu:1,wbcu:1)@    Imaging:  No results found for this or any previous visit (from the past 48 hour(s)).    Echo:  No results found for this or any previous visit (from the past 4320 hour(s)).

## 2019-03-01 NOTE — PATIENT INSTRUCTIONS
"[facility]  [facility]~[address]~[place], [place]~[address]  Thank you for your  Heart Care visit today. If you have questions regarding your visit, please contact your cardiology RN's, Janette Magana or Yudi Adam, at [phone]. Your provider has recommended the following:  Medication Changes:  1. START atorvastatin to 20 mg daily  Recommendations:  1. Stress echocardiogram  2. Fasting cholesterol panel in 3 months  Follow-up:  See Dr Baker for cardiology follow up in 1 year with fasting blood work prior    To schedule a future appointment, we kindly ask that you call cardiology scheduling at [phone] three months prior to requested revisit date.  [facility] cardiology clinic is staffed with \"Advance Practice Providers\". These are our cardiology Physician Assistants and Nurse Practitioners. Please call cardiology scheduling if you feel you need clinical evaluation with them at any time for any cardiac reason.                 Reminder:    For your safety, we ask that you bring in your current medication(s) or an updated list of your medications with you to EACH office visit. Include the medication name, dose of pill on bottle and how you are taking it. Include over-the-counter medications or supplements. Your provider will review this at each visit and plan your care based on your current information.       "

## 2019-03-01 NOTE — LETTER
3/1/2019    Ranulfo Najera MD  [address] [facility] [address]    RE: Fercho CARPENTER Alan       Dear Colleague,    I had the pleasure of seeing Fercho PAULINE Phillips in the [facility].        Cardiology Consultation     Assessment & Plan      1.  Elevated calcium score  2.  Strong family history of early premature coronary artery disease  3.  Orthopedic injuries  4.  Normal EKG    Recommendations    1.  Went over recent cardiac testing from outside facility  2.  Given elevated calcium score we will get a stress echocardiogram for baseline and to assess for any significant obstructive disease  3.  We will switch him over to Lipitor 20 mg daily  4.  Recheck cholesterol in 3 months time  5.  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  He presents from a primary preventive perspective.  He states that his father had a PCI performed at the age of 36.  He is also had a bypass in his 60s.  He has a twin brother who also has hyperlipidemia which runs in the family.  He does not have any specific cardiac complaints however due to early premature coronary artery disease in his family he underwent a calcium score at an outside facility.  This demonstrated a calcium score of 171.  As a result he presents to establish local cardiac care.  Cholesterol was checked and his LDL was elevated.  He has been on simvastatin for many years however it was advised that he should switch over to Lipitor due to not meeting target goals.  Patient had an EKG performed demonstrating normal sinus rhythm.  He does not have any other additional medical issues with the exception of orthopedic injuries in the past.  Lifelong non-smoker      Stephane Baker MD    Primary Care Physician   Ranulfo Najera      Patient Active Problem List   Diagnosis      HYPERLIPIDEMIA LDL GOAL <130     Achilles tendon tear     Abnormality of gait     Elevated LFTs     Health Care Home       Past Medical History   I have reviewed this patient's medical history and updated it with pertinent information if needed.   No past medical history on file.    Past Surgical History   I have reviewed this patient's surgical history and updated it with pertinent information if needed.  Past Surgical History:   Procedure Laterality Date     COLONOSCOPY N/A 1/11/2016    Procedure: COLONOSCOPY;  Surgeon: Kumar Nunez MD;  Location: [facility]     Left shoulder scope      Left shoulder scope     Right elbow repair      Right elbow repair     Right thumb repair      Right thumb repair       Prior to Admission Medications   Cannot display prior to admission medications because the patient has not been admitted in this contact.     [unfilled]  [unfilled]  Allergies   No Known Allergies    Social History    reports that  has never smoked. he has never used smokeless tobacco. He reports that he drinks alcohol. He reports that he does not use drugs.    Family History   Family History   Problem Relation Age of Onset     Heart Disease Father         heart disease, angioplasty with stent     Cancer - colorectal Father      C.A.D. Father      Breast Cancer Mother        Review of Systems   The comprehensive 10 point Review of Systems is negative other than noted in the HPI or here.     Physical Exam   Vital Signs with Ranges  Pulse:  [75] 75  BP: (139)/(83) 139/83  SpO2:  [99 %] 99 %  Wt Readings from Last 4 Encounters:   03/01/19 93.9 kg (207 lb)   01/17/19 95.8 kg (211 lb 3.2 oz)   10/19/18 93 kg (205 lb)   10/15/18 97.3 kg (214 lb 6.4 oz)     [unfilled]      Vitals: /83   Pulse 75   Wt 93.9 kg (207 lb)   SpO2 99%   BMI 27.16 kg/m       Constitutional:   awake, alert, cooperative, no apparent distress, and appears stated age     ENT:   Normocephalic, without obvious abnormality, atraumatic,  sinuses nontender on palpation, external ears without lesions, oral pharynx with moist mucous membranes, tonsils without erythema or exudates, gums normal and good dentition.     Neck:   Supple, symmetrical, trachea midline, no adenopathy, thyroid symmetric, not enlarged and no tenderness, skin normal     Back:   Symmetric, no curvature, spinous processes are non-tender on palpation, paraspinous muscles are non-tender on palpation, no costal vertebral tenderness     Lungs:   No increased work of breathing, good air exchange, clear to auscultation bilaterally, no crackles or wheezing     Cardiovascular:   Normal apical impulse, regular rate and rhythm, normal S1 and S2, no S3 or S4, and no murmur noted     Abdomen:   No scars, normal bowel sounds, soft, non-distended, non-tender, no masses palpated, no hepatosplenomegally     Musculoskeletal:   There is no redness, warmth, or swelling of the joints.  Full range of motion noted.  Motor strength is 5 out of 5 all extremities bilaterally.  Tone is normal.     Neurologic:   Awake, alert, oriented to name, place and time.  Cranial nerves II-XII are grossly intact.  Motor is 5 out of 5 bilaterally.  Cerebellar finger to nose, heel to shin intact.  Sensory is intact.  Babinski down going, Romberg negative, and gait is normal.       @LABRCNTIPR(tropi:5,troponinies:5)@    @LABRCNTIPR(wbc:3,hgb:3,mcv:3,plt:3,inr:3,na:3,potassium:3,chloride:3,co2:3,bun:3,cr:3,gfrestimated:3,gfrestblack:3,aniongap:3,skyla:3,glc:3,albumin:2,prottotal:2,bilitotal:2,alkphos:2,alt:2,ast:2,lipase:2,tropi:3)@  Recent Labs   Lab Test 10/08/18  1217 09/15/16  0933  11/06/14  0945 06/13/13  0937   CHOL 159 155   < > 168 153   HDL 43 44   < > 53 39*   * 90   < > 97 98   TRIG 72 104   < > 91 82   CHOLHDLRATIO  --   --   --  3.2 4.0    < > = values in this interval not displayed.      @LABRCNTIP(wbc:3,hgb:3,hct:3,mcv:3,plt:3,iron:3,ironsat:3,reticabsct:3,retp:3,feb:3,lionel:3,b12:3,folic:3,epoe:3,morph:3)@  @LABRCNTIP(PH:3,PHV:3,PO2:3,PO2V:3,sat:3,PCO2:3,PCO2V:3,HCO3:3,HCO3V:3)@  @LABRCNTIP(NTBNPI:3,NTBNP:3)@  @LABRCNTIP(DD:1)@  @LABRCNTIP(sed:3,crp:3)@  @LABRCNTIP(PLT:3)@  @LABRCNTIP(TSH:3)@  @LABRCNTIP(color:1,appearance:1,urineg,urinebili:1,urineketone:1,s,ubld:1,urineph:1,protein:1,urobilinogen:1,nitrite:1,leukest:1,rbcu:1,wbcu:1)@    Imaging:  No results found for this or any previous visit (from the past 48 hour(s)).    Echo:  No results found for this or any previous visit (from the past 4320 hour(s)).           Thank you for allowing me to participate in the care of your patient.    Sincerely,     Stephane Baker MD     [facility]

## 2019-03-14 ENCOUNTER — HOSPITAL ENCOUNTER (OUTPATIENT)
Dept: CARDIOLOGY | Facility: CLINIC | Age: 49
Discharge: HOME | End: 2019-03-14
Attending: INTERNAL MEDICINE | Admitting: INTERNAL MEDICINE
Payer: COMMERCIAL

## 2019-03-14 DIAGNOSIS — Z82.49 FAMILY HISTORY OF HEART DISEASE: ICD-10-CM

## 2019-03-14 DIAGNOSIS — R93.1 ELEVATED CORONARY ARTERY CALCIUM SCORE: ICD-10-CM

## 2019-03-14 PROCEDURE — 93321 DOPPLER ECHO F-UP/LMTD STD: CPT | Mod: 26 | Performed by: INTERNAL MEDICINE

## 2019-03-14 PROCEDURE — 93018 CV STRESS TEST I&R ONLY: CPT | Performed by: INTERNAL MEDICINE

## 2019-03-14 PROCEDURE — 93325 DOPPLER ECHO COLOR FLOW MAPG: CPT | Mod: 26 | Performed by: INTERNAL MEDICINE

## 2019-03-14 PROCEDURE — 93350 STRESS TTE ONLY: CPT | Mod: 26 | Performed by: INTERNAL MEDICINE

## 2019-03-14 PROCEDURE — 93016 CV STRESS TEST SUPVJ ONLY: CPT | Performed by: FAMILY MEDICINE

## 2019-03-14 PROCEDURE — 93325 DOPPLER ECHO COLOR FLOW MAPG: CPT | Mod: TC

## 2019-03-14 NOTE — RESULT ENCOUNTER NOTE
Results noted: THR achieved; hypertensive response to exercise but normal wall motion at rest and post-stress. Will discuss with Dr Baker re: hypertensive response to exercise.

## 2019-03-15 NOTE — RESULT ENCOUNTER NOTE
Per Dr Baker--have patient record BP at home several times a week over the next 4-6 weeks and make follow up appointment with Dr Baker to discuss numbers. Pt notified of results and recommendations. Questions answered. Follow up appt made for 4/22/19 at 10am. LVL6 message sent.

## 2019-04-05 DIAGNOSIS — E78.5 HYPERLIPIDEMIA LDL GOAL <100: ICD-10-CM

## 2019-04-05 LAB
ALT SERPL W P-5'-P-CCNC: 34 U/L (ref 0–70)
CHOLEST SERPL-MCNC: 136 MG/DL
HDLC SERPL-MCNC: 51 MG/DL
LDLC SERPL CALC-MCNC: 75 MG/DL
NONHDLC SERPL-MCNC: 85 MG/DL
TRIGL SERPL-MCNC: 49 MG/DL

## 2019-04-05 PROCEDURE — 84460 ALANINE AMINO (ALT) (SGPT): CPT | Performed by: INTERNAL MEDICINE

## 2019-04-05 PROCEDURE — 80061 LIPID PANEL: CPT | Performed by: INTERNAL MEDICINE

## 2019-04-05 PROCEDURE — 36415 COLL VENOUS BLD VENIPUNCTURE: CPT | Performed by: INTERNAL MEDICINE

## 2019-04-22 ENCOUNTER — OFFICE VISIT (OUTPATIENT)
Dept: CARDIOLOGY | Facility: CLINIC | Age: 49
End: 2019-04-22
Payer: COMMERCIAL

## 2019-04-22 VITALS
OXYGEN SATURATION: 98 % | WEIGHT: 205.6 LBS | SYSTOLIC BLOOD PRESSURE: 127 MMHG | HEART RATE: 62 BPM | BODY MASS INDEX: 26.98 KG/M2 | DIASTOLIC BLOOD PRESSURE: 78 MMHG

## 2019-04-22 DIAGNOSIS — Z82.49 FAMILY HISTORY OF HEART DISEASE: Primary | ICD-10-CM

## 2019-04-22 PROCEDURE — 99214 OFFICE O/P EST MOD 30 MIN: CPT | Performed by: INTERNAL MEDICINE

## 2019-04-22 NOTE — LETTER
4/22/2019    Ranulfo Najera MD  [address] [facility] [address]    RE: Fercho Phillips       Dear Colleague,    I had the pleasure of seeing Fercho Phillips in the [facility].        Cardiology Consultation     Assessment & Plan      1.  Elevated calcium score  2.  Strong family history of early premature coronary artery disease  3.  Orthopedic injuries  4.  Normal EKG  5.  Negative Stress echocardiogram for ischemia  6.  Hypertensive BP response to exercise (15 METS)    Recommendations    1.  Went over recent cardiac testing   2.  Reviewed BP reading from home.  Normotensive with SBP in 110`s.  Compares same to our office machine  3.  Reduce salt intake  4.  DASH diet  5.  Keep BP diary with measurements ~ 1-2 X week  6.  Good response to Lipitor with LDL now 75  7.  RTC in one year.     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  He presents from a primary preventive perspective.  He states that his father had a PCI performed at the age of 36.  He is also had a bypass in his 60s.  He has a twin brother who also has hyperlipidemia which runs in the family.  He does not have any specific cardiac complaints however due to early premature coronary artery disease in his family he underwent a calcium score at an outside facility.  This demonstrated a calcium score of 171.  As a result he presents to establish local cardiac care.  Cholesterol was checked and his LDL was elevated.  He has been on simvastatin for many years however it was advised that he should switch over to Lipitor due to not meeting target goals.  Patient had an EKG performed demonstrating normal sinus rhythm.  He does not have any other additional medical issues with the exception of orthopedic injuries in the past.  Lifelong non-smoker    Stress echo  Target Heart Rate was achieved.  The EKG portion of this stress test was negative  for inducible ischemia (see  echo results below).  There was a hypertensive BP response to exercise.  Normal left ventricular function and wall motion at rest and post-stress.        Stephane Baker MD    Primary Care Physician   Ranulfo Najera      Patient Active Problem List   Diagnosis     HYPERLIPIDEMIA LDL GOAL <130     Achilles tendon tear     Abnormality of gait     Elevated LFTs     Health Care Home       Past Medical History   I have reviewed this patient's medical history and updated it with pertinent information if needed.   No past medical history on file.    Past Surgical History   I have reviewed this patient's surgical history and updated it with pertinent information if needed.  Past Surgical History:   Procedure Laterality Date     COLONOSCOPY N/A 1/11/2016    Procedure: COLONOSCOPY;  Surgeon: Kumar Nunez MD;  Location: [facility]     Left shoulder scope      Left shoulder scope     Right elbow repair      Right elbow repair     Right thumb repair      Right thumb repair       Prior to Admission Medications   Cannot display prior to admission medications because the patient has not been admitted in this contact.     [unfilled]  [unfilled]  Allergies   No Known Allergies    Social History    reports that he has never smoked. He has never used smokeless tobacco. He reports that he drinks alcohol. He reports that he does not use drugs.    Family History   Family History   Problem Relation Age of Onset     Heart Disease Father         heart disease, angioplasty with stent     Cancer - colorectal Father      C.A.D. Father      Breast Cancer Mother        Review of Systems   The comprehensive 10 point Review of Systems is negative other than noted in the HPI or here.     Physical Exam   Vital Signs with Ranges  Pulse:  [62] 62  BP: (127)/(78) 127/78  SpO2:  [98 %] 98 %  Wt Readings from Last 4 Encounters:   04/22/19 93.3 kg (205 lb 9.6 oz)   03/01/19 93.9 kg (207 lb)   01/17/19 95.8 kg (211 lb  3.2 oz)   10/19/18 93 kg (205 lb)     [unfilled]      Vitals: /78 (BP Location: Right arm, Patient Position: Sitting, Cuff Size: Adult Regular)   Pulse 62   Wt 93.3 kg (205 lb 9.6 oz)   SpO2 98%   BMI 26.98 kg/m       Constitutional:   awake, alert, cooperative, no apparent distress, and appears stated age     ENT:   Normocephalic, without obvious abnormality, atraumatic, sinuses nontender on palpation, external ears without lesions, oral pharynx with moist mucous membranes, tonsils without erythema or exudates, gums normal and good dentition.     Neck:   Supple, symmetrical, trachea midline, no adenopathy, thyroid symmetric, not enlarged and no tenderness, skin normal     Back:   Symmetric, no curvature, spinous processes are non-tender on palpation, paraspinous muscles are non-tender on palpation, no costal vertebral tenderness     Lungs:   No increased work of breathing, good air exchange, clear to auscultation bilaterally, no crackles or wheezing     Cardiovascular:   Normal apical impulse, regular rate and rhythm, normal S1 and S2, no S3 or S4, and no murmur noted     Abdomen:   No scars, normal bowel sounds, soft, non-distended, non-tender, no masses palpated, no hepatosplenomegally     Musculoskeletal:   There is no redness, warmth, or swelling of the joints.  Full range of motion noted.  Motor strength is 5 out of 5 all extremities bilaterally.  Tone is normal.     Neurologic:   Awake, alert, oriented to name, place and time.  Cranial nerves II-XII are grossly intact.  Motor is 5 out of 5 bilaterally.  Cerebellar finger to nose, heel to shin intact.  Sensory is intact.  Babinski down going, Romberg negative, and gait is normal.       @LABRCNTIPR(tropi:5,troponinies:5)@    @LABRCNTIPR(wbc:3,hgb:3,mcv:3,plt:3,inr:3,na:3,potassium:3,chloride:3,co2:3,bun:3,cr:3,gfrestimated:3,gfrestblack:3,aniongap:3,skyla:3,glc:3,albumin:2,prottotal:2,bilitotal:2,alkphos:2,alt:2,ast:2,lipase:2,tropi:3)@  Recent  Labs   Lab Test 10/08/18  1217 09/15/16  0933  14  0945 13  0937   CHOL 159 155   < > 168 153   HDL 43 44   < > 53 39*   * 90   < > 97 98   TRIG 72 104   < > 91 82   CHOLHDLRATIO  --   --   --  3.2 4.0    < > = values in this interval not displayed.     @LABRCNTIP(wbc:3,hgb:3,hct:3,mcv:3,plt:3,iron:3,ironsat:3,reticabsct:3,retp:3,feb:3,lionel:3,b12:3,folic:3,epoe:3,morph:3)@  @LABRCNTIP(PH:3,PHV:3,PO2:3,PO2V:3,sat:3,PCO2:3,PCO2V:3,HCO3:3,HCO3V:3)@  @LABRCNTIP(NTBNPI:3,NTBNP:3)@  @LABRCNTIP(DD:1)@  @LABRCNTIP(sed:3,crp:3)@  @LABRCNTIP(PLT:3)@  @LABRCNTIP(TSH:3)@  @LABRCNTIP(color:1,appearance:1,urineg,urinebili:1,urineketone:1,s,ubld:1,urineph:1,protein:1,urobilinogen:1,nitrite:1,leukest:1,rbcu:1,wbcu:1)@    Imaging:  No results found for this or any previous visit (from the past 48 hour(s)).    Echo:  No results found for this or any previous visit (from the past 4320 hour(s)).             Thank you for allowing me to participate in the care of your patient.      Sincerely,     Stephane Baker MD     [facility]

## 2019-08-03 DIAGNOSIS — Z82.49 FAMILY HISTORY OF HEART DISEASE: ICD-10-CM

## 2019-10-02 ENCOUNTER — HEALTH MAINTENANCE LETTER (OUTPATIENT)
Age: 49
End: 2019-10-02

## 2019-10-06 DIAGNOSIS — Z82.49 FAMILY HISTORY OF HEART DISEASE: ICD-10-CM

## 2019-10-18 NOTE — PROGRESS NOTES
Cardiology Consultation     Assessment & Plan      1.  Elevated calcium score  2.  Strong family history of early premature coronary artery disease  3.  Orthopedic injuries  4.  Normal EKG  5.  Negative Stress echocardiogram for ischemia  6.  Hypertensive BP response to exercise (15 METS)    Recommendations    1.  Went over recent cardiac testing   2.  Reviewed BP reading from home.  Normotensive with SBP in 110`s.  Compares same to our office machine  3.  Reduce salt intake  4.  DASH diet  5.  Keep BP diary with measurements ~ 1-2 X week  6.  Good response to Lipitor with LDL now 75  7.  RTC in one year.     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  He presents from a primary preventive perspective.  He states that his father had a PCI performed at the age of 36.  He is also had a bypass in his 60s.  He has a twin brother who also has hyperlipidemia which runs in the family.  He does not have any specific cardiac complaints however due to early premature coronary artery disease in his family he underwent a calcium score at an outside facility.  This demonstrated a calcium score of 171.  As a result he presents to establish local cardiac care.  Cholesterol was checked and his LDL was elevated.  He has been on simvastatin for many years however it was advised that he should switch over to Lipitor due to not meeting target goals.  Patient had an EKG performed demonstrating normal sinus rhythm.  He does not have any other additional medical issues with the exception of orthopedic injuries in the past.  Lifelong non-smoker    Stress echo  Target Heart Rate was achieved.  The EKG portion of this stress test was negative for inducible ischemia (see  echo results below).  There was a hypertensive BP response to exercise.  Normal left ventricular function and wall motion at rest and post-stress.        Stephane Baker  Arrived from ED [facility] as a transfer due to brain bleed. Patient recently discharged from [facility] for hyperglycemia, treated, then d/c to home. Family then reported that patient \"was not acting right,\" and was sent to ED again. CT scans showed bleed. Systolic BP <941 recommended.      Nicardepine infusion at 15mg/hr MD    Primary Care Physician   Ranulfo Najera      Patient Active Problem List   Diagnosis     HYPERLIPIDEMIA LDL GOAL <130     Achilles tendon tear     Abnormality of gait     Elevated LFTs     Health Care Home       Past Medical History   I have reviewed this patient's medical history and updated it with pertinent information if needed.   No past medical history on file.    Past Surgical History   I have reviewed this patient's surgical history and updated it with pertinent information if needed.  Past Surgical History:   Procedure Laterality Date     COLONOSCOPY N/A 1/11/2016    Procedure: COLONOSCOPY;  Surgeon: Kumar Nunez MD;  Location: [facility]     Left shoulder scope      Left shoulder scope     Right elbow repair      Right elbow repair     Right thumb repair      Right thumb repair       Prior to Admission Medications   Cannot display prior to admission medications because the patient has not been admitted in this contact.     [unfilled]  [unfilled]  Allergies   No Known Allergies    Social History    reports that he has never smoked. He has never used smokeless tobacco. He reports that he drinks alcohol. He reports that he does not use drugs.    Family History   Family History   Problem Relation Age of Onset     Heart Disease Father         heart disease, angioplasty with stent     Cancer - colorectal Father      C.A.D. Father      Breast Cancer Mother        Review of Systems   The comprehensive 10 point Review of Systems is negative other than noted in the HPI or here.     Physical Exam   Vital Signs with Ranges  Pulse:  [62] 62  BP: (127)/(78) 127/78  SpO2:  [98 %] 98 %  Wt Readings from Last 4 Encounters:   04/22/19 93.3 kg (205 lb 9.6 oz)   03/01/19 93.9 kg (207 lb)   01/17/19 95.8 kg (211 lb 3.2 oz)   10/19/18 93 kg (205 lb)     [unfilled]      Vitals: /78 (BP Location: Right arm, Patient Position: Sitting, Cuff Size: Adult Regular)   Pulse 62   Wt 93.3 kg (205 lb 9.6 oz)   SpO2  98%   BMI 26.98 kg/m      Constitutional:   awake, alert, cooperative, no apparent distress, and appears stated age     ENT:   Normocephalic, without obvious abnormality, atraumatic, sinuses nontender on palpation, external ears without lesions, oral pharynx with moist mucous membranes, tonsils without erythema or exudates, gums normal and good dentition.     Neck:   Supple, symmetrical, trachea midline, no adenopathy, thyroid symmetric, not enlarged and no tenderness, skin normal     Back:   Symmetric, no curvature, spinous processes are non-tender on palpation, paraspinous muscles are non-tender on palpation, no costal vertebral tenderness     Lungs:   No increased work of breathing, good air exchange, clear to auscultation bilaterally, no crackles or wheezing     Cardiovascular:   Normal apical impulse, regular rate and rhythm, normal S1 and S2, no S3 or S4, and no murmur noted     Abdomen:   No scars, normal bowel sounds, soft, non-distended, non-tender, no masses palpated, no hepatosplenomegally     Musculoskeletal:   There is no redness, warmth, or swelling of the joints.  Full range of motion noted.  Motor strength is 5 out of 5 all extremities bilaterally.  Tone is normal.     Neurologic:   Awake, alert, oriented to name, place and time.  Cranial nerves II-XII are grossly intact.  Motor is 5 out of 5 bilaterally.  Cerebellar finger to nose, heel to shin intact.  Sensory is intact.  Babinski down going, Romberg negative, and gait is normal.       @LABRCNTIPR(tropi:5,troponinies:5)@    @LABRCNTIPR(wbc:3,hgb:3,mcv:3,plt:3,inr:3,na:3,potassium:3,chloride:3,co2:3,bun:3,cr:3,gfrestimated:3,gfrestblack:3,aniongap:3,skyla:3,glc:3,albumin:2,prottotal:2,bilitotal:2,alkphos:2,alt:2,ast:2,lipase:2,tropi:3)@  Recent Labs   Lab Test 10/08/18  1217 09/15/16  0933  11/06/14  0945 06/13/13  0937   CHOL 159 155   < > 168 153   HDL 43 44   < > 53 39*   * 90   < > 97 98   TRIG 72 104   < > 91 82   CHOLHDLRATIO  --   --    --  3.2 4.0    < > = values in this interval not displayed.     @LABRCNTIP(wbc:3,hgb:3,hct:3,mcv:3,plt:3,iron:3,ironsat:3,reticabsct:3,retp:3,feb:3,lionel:3,b12:3,folic:3,epoe:3,morph:3)@  @LABRCNTIP(PH:3,PHV:3,PO2:3,PO2V:3,sat:3,PCO2:3,PCO2V:3,HCO3:3,HCO3V:3)@  @LABRCNTIP(NTBNPI:3,NTBNP:3)@  @LABRCNTIP(DD:1)@  @LABRCNTIP(sed:3,crp:3)@  @LABRCNTIP(PLT:3)@  @LABRCNTIP(TSH:3)@  @LABRCNTIP(color:1,appearance:1,urineg,urinebili:1,urineketone:1,s,ubld:1,urineph:1,protein:1,urobilinogen:1,nitrite:1,leukest:1,rbcu:1,wbcu:1)@    Imaging:  No results found for this or any previous visit (from the past 48 hour(s)).    Echo:  No results found for this or any previous visit (from the past 4320 hour(s)).

## 2019-10-30 ENCOUNTER — HEALTH MAINTENANCE LETTER (OUTPATIENT)
Age: 49
End: 2019-10-30

## 2020-01-16 ENCOUNTER — OFFICE VISIT (OUTPATIENT)
Dept: FAMILY MEDICINE | Facility: CLINIC | Age: 50
End: 2020-01-16
Payer: COMMERCIAL

## 2020-01-16 VITALS
BODY MASS INDEX: 27.61 KG/M2 | WEIGHT: 208.3 LBS | TEMPERATURE: 97.6 F | HEIGHT: 73 IN | SYSTOLIC BLOOD PRESSURE: 130 MMHG | HEART RATE: 69 BPM | RESPIRATION RATE: 16 BRPM | DIASTOLIC BLOOD PRESSURE: 80 MMHG

## 2020-01-16 DIAGNOSIS — H69.93 DYSFUNCTION OF BOTH EUSTACHIAN TUBES: Primary | ICD-10-CM

## 2020-01-16 PROCEDURE — 99213 OFFICE O/P EST LOW 20 MIN: CPT | Performed by: FAMILY MEDICINE

## 2020-01-16 ASSESSMENT — MIFFLIN-ST. JEOR: SCORE: 1863.72

## 2020-01-16 ASSESSMENT — PAIN SCALES - GENERAL: PAINLEVEL: NO PAIN (0)

## 2020-01-16 NOTE — PROGRESS NOTES
SUBJECTIVE:                                                    Fercho Phillips is a 49 year old male who presents to clinic today for the following health issues:    Concern - his left ear feels plugged  Onset: a couple days    Description:   He says the side of his neck was hurting a little bit a couple days ago and that has subsided. Now he says his ear just feels plugged. There is no pain.     Intensity: moderate    Progression of Symptoms:  same    Accompanying Signs & Symptoms:  none    Previous history of similar problem:   none    Therapies Tried and outcome: He has not tried anything.    Problem list and histories reviewed & adjusted, as indicated.  Additional history:     Patient Active Problem List   Diagnosis     HYPERLIPIDEMIA LDL GOAL <130     Achilles tendon tear     Abnormality of gait     Elevated LFTs     Health Care Home     Past Surgical History:   Procedure Laterality Date     COLONOSCOPY N/A 1/11/2016    Procedure: COLONOSCOPY;  Surgeon: Kumar Nunez MD;  Location: [facility]     Left shoulder scope      Left shoulder scope     Right elbow repair      Right elbow repair     Right thumb repair      Right thumb repair       Social History     Tobacco Use     Smoking status: Never Smoker     Smokeless tobacco: Never Used   Substance Use Topics     Alcohol use: Yes     Comment: 2-3 beers per week     Family History   Problem Relation Age of Onset     Heart Disease Father         heart disease, angioplasty with stent     Cancer - colorectal Father      C.A.D. Father      Breast Cancer Mother          Current Outpatient Medications   Medication Sig Dispense Refill     ASPIRIN 81 MG OR TABS 1 TABLET DAILY       atorvastatin (LIPITOR) 20 MG tablet Take 1 tablet (20 mg) by mouth daily 90 tablet 3     Cholecalciferol (VITAMIN D3 PO) Take 1 tablet by mouth daily.       cinnamon 500 MG TABS Take 1 tablet by mouth daily.       Coenzyme Q10 (CO Q 10) 10 MG CAPS Take  by mouth.       Flaxseed, Linseed, (FLAX SEED  "OIL) 1000 MG capsule Take 1 capsule by mouth daily.       Garlic 1000 MG CAPS Take 1 tablet by mouth daily.       ibuprofen (ADVIL,MOTRIN) 200 MG tablet Take 2 tablets by mouth daily as needed.       Multiple Vitamin (MULTI-VITAMIN) per tablet Take 1 tablet by mouth daily.       NIACIN PO Take 1 tablet by mouth daily       Nutritional Supplements (FRUIT & VEGETABLE DAILY PO) Take 1 capsule by mouth daily.       Omega-3 Fatty Acids (FISH OIL PO) Take 1 capsule by mouth daily.       Resveratrol 100 MG CAPS Take 1 capsule by mouth daily.       UNABLE TO FIND Take 1 tablet by mouth daily. MEDICATION NAME: L-Anginine & L-Citrulline  500mg       No Known Allergies    ROS:  Constitutional, HEENT, cardiovascular, pulmonary, gi and gu systems are negative, except as otherwise noted.    OBJECTIVE:                                                    /80   Pulse 69   Temp 97.6  F (36.4  C) (Tympanic)   Resp 16   Ht 1.854 m (6' 1\")   Wt 94.5 kg (208 lb 4.8 oz)   BMI 27.48 kg/m   Body mass index is 27.48 kg/m .   GENERAL: healthy, alert, well nourished, well hydrated, no distress  HENT: ear canals- normal; TMs- normal; Nose- normal; Mouth- no ulcers, no lesions  RESP: lungs clear to auscultation - no rales, no rhonchi, no wheezes  CV: regular rates and rhythm, normal S1 S2, no S3 or S4 and no murmur, no click or rub -  ABDOMEN: soft, no tenderness, no  hepatosplenomegaly, no masses, normal bowel sounds     tyampanogram  decreas motion   ASSESSMENT/PLAN:                                                      (H69.83) Dysfunction of both eustachian tubes  (primary encounter diagnosis)    flonase daily  return to clinic, or call if unimproved in 2-4 weeks sooner if worse.         reports that he has never smoked. He has never used smokeless tobacco.      Weight management plan: Discussed healthy diet and exercise guidelines    [facility]"

## 2020-02-11 ENCOUNTER — OFFICE VISIT (OUTPATIENT)
Dept: FAMILY MEDICINE | Facility: CLINIC | Age: 50
End: 2020-02-11
Payer: COMMERCIAL

## 2020-02-11 VITALS
HEIGHT: 74 IN | OXYGEN SATURATION: 98 % | HEART RATE: 60 BPM | BODY MASS INDEX: 26.84 KG/M2 | RESPIRATION RATE: 16 BRPM | SYSTOLIC BLOOD PRESSURE: 125 MMHG | DIASTOLIC BLOOD PRESSURE: 86 MMHG | WEIGHT: 209.1 LBS | TEMPERATURE: 97.4 F

## 2020-02-11 DIAGNOSIS — R10.9 LEFT FLANK PAIN: Primary | ICD-10-CM

## 2020-02-11 LAB
ALBUMIN UR-MCNC: NEGATIVE MG/DL
ANION GAP SERPL CALCULATED.3IONS-SCNC: 1 MMOL/L (ref 3–14)
APPEARANCE UR: CLEAR
BILIRUB UR QL STRIP: NEGATIVE
BUN SERPL-MCNC: 17 MG/DL (ref 7–30)
CALCIUM SERPL-MCNC: 8.9 MG/DL (ref 8.5–10.1)
CHLORIDE SERPL-SCNC: 104 MMOL/L (ref 94–109)
CO2 SERPL-SCNC: 31 MMOL/L (ref 20–32)
COLOR UR AUTO: YELLOW
CREAT SERPL-MCNC: 0.94 MG/DL (ref 0.66–1.25)
ERYTHROCYTE [DISTWIDTH] IN BLOOD BY AUTOMATED COUNT: 11.9 % (ref 10–15)
GFR SERPL CREATININE-BSD FRML MDRD: >90 ML/MIN/{1.73_M2}
GLUCOSE SERPL-MCNC: 94 MG/DL (ref 70–99)
GLUCOSE UR STRIP-MCNC: NEGATIVE MG/DL
HCT VFR BLD AUTO: 46.8 % (ref 40–53)
HGB BLD-MCNC: 15.9 G/DL (ref 13.3–17.7)
HGB UR QL STRIP: NEGATIVE
KETONES UR STRIP-MCNC: NEGATIVE MG/DL
LEUKOCYTE ESTERASE UR QL STRIP: NEGATIVE
MCH RBC QN AUTO: 30.7 PG (ref 26.5–33)
MCHC RBC AUTO-ENTMCNC: 34 G/DL (ref 31.5–36.5)
MCV RBC AUTO: 90 FL (ref 78–100)
NITRATE UR QL: NEGATIVE
PH UR STRIP: 5.5 PH (ref 5–7)
PLATELET # BLD AUTO: 193 10E9/L (ref 150–450)
POTASSIUM SERPL-SCNC: 4.3 MMOL/L (ref 3.4–5.3)
RBC # BLD AUTO: 5.18 10E12/L (ref 4.4–5.9)
RBC #/AREA URNS AUTO: NORMAL /HPF
SODIUM SERPL-SCNC: 136 MMOL/L (ref 133–144)
SOURCE: NORMAL
SP GR UR STRIP: 1.02 (ref 1–1.03)
UROBILINOGEN UR STRIP-ACNC: 0.2 EU/DL (ref 0.2–1)
WBC # BLD AUTO: 5.3 10E9/L (ref 4–11)
WBC #/AREA URNS AUTO: NORMAL /HPF

## 2020-02-11 PROCEDURE — 36415 COLL VENOUS BLD VENIPUNCTURE: CPT | Performed by: FAMILY MEDICINE

## 2020-02-11 PROCEDURE — 81001 URINALYSIS AUTO W/SCOPE: CPT | Performed by: FAMILY MEDICINE

## 2020-02-11 PROCEDURE — 85027 COMPLETE CBC AUTOMATED: CPT | Performed by: FAMILY MEDICINE

## 2020-02-11 PROCEDURE — 80048 BASIC METABOLIC PNL TOTAL CA: CPT | Performed by: FAMILY MEDICINE

## 2020-02-11 PROCEDURE — 99214 OFFICE O/P EST MOD 30 MIN: CPT | Performed by: FAMILY MEDICINE

## 2020-02-11 ASSESSMENT — MIFFLIN-ST. JEOR: SCORE: 1875.97

## 2020-02-11 NOTE — PROGRESS NOTES
"Subjective     Fercho Phillips is a 49 year old male who presents to clinic today for the following health issues:    HPI   Back Pain       Duration: 1.5 weeks started with left side rib pain, left lower back started Saturday        Specific cause: Plays in a flag football league and thinks he got hit there.  No bruising or bleeding.  Hurt a bit after that game but has gotten worse since he started coughing with recent cold.  Non productive.    Description:   Location of pain: low back left and Left sided rib pain  Character of pain: dull ache in ribs when touching or moving a certain way and burning in his back when he bends down  Pain radiation:none  New numbness or weakness in legs, not attributed to pain:  no     Intensity: moderate    History:   Pain interferes with job: No  History of back problems: no prior back problems  Any previous MRI or X-rays: None  Sees a specialist for back pain:  No  Therapies tried without relief: Ibuprofen occasionally, doesn't hurt enough for him to take anything     Alleviating factors:   Improved by: None      Precipitating factors:  Worsened by: Lifting, Bending, Sitting and Coughing          Accompanying Signs & Symptoms:  Risk of Fracture:  None  Risk of Cauda Equina:  None  Risk of Infection:  None  Risk of Cancer:  None  Risk of Ankylosing Spondylitis:  Onset at age <35, male, AND morning back stiffness. no         Reviewed and updated as needed this visit by Provider         Review of Systems   ROS COMP: Constitutional, HEENT, cardiovascular, pulmonary, gi and gu systems are negative, except as otherwise noted.      Objective    /86   Pulse 60   Temp 97.4  F (36.3  C) (Tympanic)   Resp 16   Ht 1.868 m (6' 1.54\")   Wt 94.8 kg (209 lb 1.6 oz)   SpO2 98%   BMI 27.18 kg/m    Body mass index is 27.18 kg/m .  Physical Exam   GENERAL: healthy, alert and no distress  NECK: no adenopathy, no asymmetry, masses, or scars and thyroid normal to palpation  RESP: lungs clear to " auscultation - no rales, rhonchi or wheezes  CV: regular rate and rhythm, normal S1 S2, no S3 or S4, no murmur, click or rub, no peripheral edema and peripheral pulses strong  ABDOMEN: soft, nontender, no hepatosplenomegaly, no masses and bowel sounds normal  MS: no gross musculoskeletal defects noted, no edema  SKIN: no suspicious lesions or rashes  NEURO: Normal strength and tone, mentation intact and speech normal  BACK: no CVA tenderness, no paralumbar tenderness.  Mildly tender to palpation along the 11th rib laterally.  No crepitice or step off.    Diagnostic Test Results:  Labs reviewed in Epic  Results for orders placed or performed in visit on 02/11/20 (from the past 24 hour(s))   UA with Microscopic   Result Value Ref Range    Color Urine Yellow     Appearance Urine Clear     Glucose Urine Negative NEG^Negative mg/dL    Bilirubin Urine Negative NEG^Negative    Ketones Urine Negative NEG^Negative mg/dL    Specific Gravity Urine 1.020 1.003 - 1.035    pH Urine 5.5 5.0 - 7.0 pH    Protein Albumin Urine Negative NEG^Negative mg/dL    Urobilinogen Urine 0.2 0.2 - 1.0 EU/dL    Nitrite Urine Negative NEG^Negative    Blood Urine Negative NEG^Negative    Leukocyte Esterase Urine Negative NEG^Negative    Source Midstream Urine     WBC Urine 0 - 5 OTO5^0 - 5 /HPF    RBC Urine O - 2 OTO2^O - 2 /HPF   CBC with platelets   Result Value Ref Range    WBC 5.3 4.0 - 11.0 10e9/L    RBC Count 5.18 4.4 - 5.9 10e12/L    Hemoglobin 15.9 13.3 - 17.7 g/dL    Hematocrit 46.8 40.0 - 53.0 %    MCV 90 78 - 100 fl    MCH 30.7 26.5 - 33.0 pg    MCHC 34.0 31.5 - 36.5 g/dL    RDW 11.9 10.0 - 15.0 %    Platelet Count 193 150 - 450 10e9/L           Assessment & Plan     1. Left flank pain  Mild tenderness along the lateral 11th rib.  No crepitice.  No step off.  Possible rib contusion from flag football with mild exacerbation from cough.  NO SOB, tachypnea or hypoxia.  Abdominal exam benign.  - CBC with platelets  - Basic metabolic panel   (Ca, Cl, CO2, Creat, Gluc, K, Na, BUN)  - UA with Microscopic       See Patient Instructions        Juana Durand MD  [facility]

## 2020-02-11 NOTE — PATIENT INSTRUCTIONS
Patient Education     Rib Contusion     A rib contusion is a bruise to one or more rib bones. It may cause pain, tenderness, swelling and a purplish discoloration. There may be a sharp pain while breathing.  You will be assessed for other injuries. You will likely be given pain medicine. Rib contusions heal on their own, without further treatment. However, pain may take weeks to months to go away.   Note that a small crack (fracture) in the rib may cause the same symptoms as a rib contusion. The small crack may not be seen on a chest X-ray. However, the conditions are managed in the same way.  Home care    Rest. Avoid heavy lifting, strenuous exertion, or any activity that causes pain.    Ice the area to reduce pain and swelling. Put ice cubes in a plastic bag or use a cold pack. (Wrap the cold source in a thin towel. Do not place it directly on your skin.) Ice the injured area for 20 minutes every 1 to 2 hours the first day. Continue with ice packs 3 to 4 times a day for the next 2 days, then as needed for the relief of pain and swelling.    Take any prescribed pain medicine as directed by your healthcare provider. If none was prescribed, take acetaminophen, ibuprofen, or naproxen to control pain.    If you have a significant injury, you may be given a device called an incentive spirometer to keep your lungs healthy. Use as directed.  Follow-up care  Follow up with your healthcare provider during the next week or as directed.  When to seek medical advice  Call your healthcare provider for any of the following:    Shortness of breath or trouble breathing    Increasing chest pain with breathing    Coughing    Dizziness, weakness, or fainting    New or worsening pain    Fever of 100.4 F (38 C) or higher, or as directed by your healthcare provider  Date Last Reviewed: 2/1/2017 2000-2019 [employer]. [address] [place] [address], [place] [address]. All rights reserved. This information is not intended as a  substitute for professional medical care. Always follow your healthcare professional's instructions.           Patient Education     Exercises to Strengthen Your Lower Back  Strong lower back and abdominal muscles work together to support your spine. The exercises below will help strengthen the lower back. It is important that you begin exercising slowly and increase levels gradually.  Always begin any exercise program with stretching. If you feel pain while doing any of these exercises, stop and talk to your doctor about a more specific exercise program that better suits your condition.   Low back stretch  The point of stretching is to make you more flexible and increase your range of motion. Stretch only as much as you are able. Stretch slowly. Do not push your stretch to the limit. If at any point you feel pain while stretching, this is your (temporary) limit.    Lie on your back with your knees bent and both feet on the ground.    Slowly raise your left knee to your chest as you flatten your lower back against the floor. Hold for 5 seconds.    Relax and repeat the exercise with your right knee.    Do 10 of these exercises for each leg.    Repeat hugging both knees to your chest at the same time.  Building lower back strength  Start your exercise routine with 10 to 30 minutes a day, 1 to 3 times a day.  Initial exercises  Lying on your back:  1. Ankle pumps: Move your foot up and down, towards your head, and then away. Repeat 10 times with each foot.  2. Heel slides: Slowly bend your knee, drawing the heel of your foot towards you. Then slide your heel/foot from you, straightening your knee. Do not lift your foot off the floor (this is not a leg lift).  3. Abdominal contraction: Bend your knees and put your hands on your stomach. Tighten your stomach muscles. Hold for 5 seconds, then relax. Repeat 10 times.  4. Straight leg raise: Bend one leg at the knee and keep the other leg straight. Tighten your stomach  muscles. Slowly lift your straight leg 6 to 12 inches off the floor and hold for up to 5 seconds. Repeat 10 times on each side.  Standin. Wall squats: Stand with your back against the wall. Move your feet about 12 inches away from the wall. Tighten your stomach muscles, and slowly bend your knees until they are at about a 45 degree angle. Do not go down too far. Hold about 5 seconds. Then slowly return to your starting position. Repeat 10 times.  2. Heel raises: Stand facing the wall. Slowly raise the heels of your feet up and down, while keeping your toes on the floor. If you have trouble balancing, you can touch the wall with your hands. Repeat 10 times.  More advanced exercises  When you feel comfortable enough, try these exercises.  1. Kneeling lumbar extension: Begin on your hands and knees. At the same time, raise and straighten your right arm and left leg until they are parallel to the ground. Hold for 2 seconds and come back slowly to a starting position. Repeat with left arm and right leg, alternating 10 times.  2. Prone lumbar extension: Lie face down, arms extended overhead, palms on the floor. At the same time, raise your right arm and left leg as high as comfortably possible. Hold for 10 seconds and slowly return to start. Repeat with left arm and right leg, alternating 10 times. Gradually build up to 20 times. (Advanced: Repeat this exercise raising both arms and both legs a few inches off the floor at the same time. Hold for 5 seconds and release.)  3. Pelvic tilt: Lie on the floor on your back with your knees bent at 90 degrees. Your feet should be flat on the floor. Inhale, exhale, then slowly contract your abdominal muscles bringing your navel toward your spine. Let your pelvis rock back until your lower back is flat on the floor. Hold for 10 seconds while breathing smoothly.  4. Abdominal crunch: Perform a pelvic tilt (above) flattening your lower back against the floor. Holding the tension  in your abdominal muscles, take another breath and raise your shoulder blades off the ground (this is not a full sit-up). Keep your head in line with your body (don t bend your neck forward). Hold for 2 seconds, then slowly lower.  Date Last Reviewed: 6/1/2016 2000-2019 [employer]. [address] [place] [address], [place] [address]. All rights reserved. This information is not intended as a substitute for professional medical care. Always follow your healthcare professional's instructions.         You may take ibuprophen 400-600mg three times daily with food, as needed.  Gentle stretches may help as well.     We will notify you of the results of your labs when they are available.      Please call/return if issues arise, new symptoms develop or present symptoms do not improve

## 2020-02-11 NOTE — LETTER
February 13, 2020      Fercho Phillips  [address] [place] [address]        Dear Fercho,    We are writing to inform you of your test results. Your blood and urine results are normal.       Resulted Orders   CBC with platelets   Result Value Ref Range    WBC 5.3 4.0 - 11.0 10e9/L    RBC Count 5.18 4.4 - 5.9 10e12/L    Hemoglobin 15.9 13.3 - 17.7 g/dL    Hematocrit 46.8 40.0 - 53.0 %    MCV 90 78 - 100 fl    MCH 30.7 26.5 - 33.0 pg    MCHC 34.0 31.5 - 36.5 g/dL    RDW 11.9 10.0 - 15.0 %    Platelet Count 193 150 - 450 10e9/L   Basic metabolic panel  (Ca, Cl, CO2, Creat, Gluc, K, Na, BUN)   Result Value Ref Range    Sodium 136 133 - 144 mmol/L    Potassium 4.3 3.4 - 5.3 mmol/L    Chloride 104 94 - 109 mmol/L    Carbon Dioxide 31 20 - 32 mmol/L    Anion Gap 1 (L) 3 - 14 mmol/L    Glucose 94 70 - 99 mg/dL      Comment:      Non Fasting    Urea Nitrogen 17 7 - 30 mg/dL    Creatinine 0.94 0.66 - 1.25 mg/dL    GFR Estimate >90 >60 mL/min/[1.73_m2]      Comment:      Non  GFR Calc  Starting 12/18/2018, serum creatinine based estimated GFR (eGFR) will be   calculated using the Chronic Kidney Disease Epidemiology Collaboration   (CKD-EPI) equation.      GFR Estimate If Black >90 >60 mL/min/[1.73_m2]      Comment:       GFR Calc  Starting 12/18/2018, serum creatinine based estimated GFR (eGFR) will be   calculated using the Chronic Kidney Disease Epidemiology Collaboration   (CKD-EPI) equation.      Calcium 8.9 8.5 - 10.1 mg/dL   UA with Microscopic   Result Value Ref Range    Color Urine Yellow     Appearance Urine Clear     Glucose Urine Negative NEG^Negative mg/dL    Bilirubin Urine Negative NEG^Negative    Ketones Urine Negative NEG^Negative mg/dL    Specific Gravity Urine 1.020 1.003 - 1.035    pH Urine 5.5 5.0 - 7.0 pH    Protein Albumin Urine Negative NEG^Negative mg/dL    Urobilinogen Urine 0.2 0.2 - 1.0 EU/dL    Nitrite Urine Negative NEG^Negative    Blood Urine Negative  NEG^Negative    Leukocyte Esterase Urine Negative NEG^Negative    Source Midstream Urine     WBC Urine 0 - 5 OTO5^0 - 5 /HPF    RBC Urine O - 2 OTO2^O - 2 /HPF       If you have any questions or concerns, please call the clinic at the number listed above.       Sincerely,        Juana Durand MD

## 2020-03-26 ENCOUNTER — TELEPHONE (OUTPATIENT)
Dept: FAMILY MEDICINE | Facility: CLINIC | Age: 50
End: 2020-03-26

## 2020-03-26 DIAGNOSIS — Z82.49 FAMILY HISTORY OF HEART DISEASE: ICD-10-CM

## 2020-03-26 RX ORDER — ATORVASTATIN CALCIUM 20 MG/1
20 TABLET, FILM COATED ORAL DAILY
Qty: 90 TABLET | Refills: 3 | Status: SHIPPED | OUTPATIENT
Start: 2020-03-26 | End: 2020-10-15

## 2020-03-26 NOTE — TELEPHONE ENCOUNTER
Reason for Call:  Other prescription    Detailed comments: Fercho is calling for refill on his atorvastatin.  When looking in the chart it was send to [facility] on 3/1/20 with confirmation that they received.  I spoke with the pharmacist and they never received script. It needs to be resend please.  Thank you..Kisha Crum    Phone Number Patient can be reached at: Home number on file [phone] (home)    Best Time: any time    Can we leave a detailed message on this number? YES    Call taken on 3/26/2020 at 10:11 AM by Kisha Crum

## 2020-10-15 ENCOUNTER — OFFICE VISIT (OUTPATIENT)
Dept: FAMILY MEDICINE | Facility: CLINIC | Age: 50
End: 2020-10-15
Payer: COMMERCIAL

## 2020-10-15 VITALS
HEART RATE: 56 BPM | TEMPERATURE: 97 F | BODY MASS INDEX: 27.18 KG/M2 | SYSTOLIC BLOOD PRESSURE: 132 MMHG | WEIGHT: 211.8 LBS | RESPIRATION RATE: 16 BRPM | DIASTOLIC BLOOD PRESSURE: 84 MMHG | HEIGHT: 74 IN

## 2020-10-15 DIAGNOSIS — Z00.00 ROUTINE GENERAL MEDICAL EXAMINATION AT A HEALTH CARE FACILITY: ICD-10-CM

## 2020-10-15 DIAGNOSIS — Z82.49 FAMILY HISTORY OF HEART DISEASE: ICD-10-CM

## 2020-10-15 DIAGNOSIS — Z13.220 SCREENING FOR HYPERLIPIDEMIA: ICD-10-CM

## 2020-10-15 DIAGNOSIS — Z11.4 SCREENING FOR HIV (HUMAN IMMUNODEFICIENCY VIRUS): ICD-10-CM

## 2020-10-15 DIAGNOSIS — Z12.5 SCREENING FOR PROSTATE CANCER: Primary | ICD-10-CM

## 2020-10-15 DIAGNOSIS — Z00.01 ENCOUNTER FOR ROUTINE ADULT MEDICAL EXAM WITH ABNORMAL FINDINGS: ICD-10-CM

## 2020-10-15 LAB
CHOLEST SERPL-MCNC: 160 MG/DL
GLUCOSE SERPL-MCNC: 96 MG/DL (ref 70–99)
HDLC SERPL-MCNC: 52 MG/DL
LDLC SERPL CALC-MCNC: 92 MG/DL
NONHDLC SERPL-MCNC: 108 MG/DL
PSA SERPL-ACNC: 0.6 UG/L (ref 0–4)
TRIGL SERPL-MCNC: 80 MG/DL

## 2020-10-15 PROCEDURE — 87389 HIV-1 AG W/HIV-1&-2 AB AG IA: CPT | Performed by: FAMILY MEDICINE

## 2020-10-15 PROCEDURE — 82947 ASSAY GLUCOSE BLOOD QUANT: CPT | Performed by: FAMILY MEDICINE

## 2020-10-15 PROCEDURE — 80061 LIPID PANEL: CPT | Performed by: FAMILY MEDICINE

## 2020-10-15 PROCEDURE — 99396 PREV VISIT EST AGE 40-64: CPT | Performed by: FAMILY MEDICINE

## 2020-10-15 PROCEDURE — G0103 PSA SCREENING: HCPCS | Performed by: FAMILY MEDICINE

## 2020-10-15 PROCEDURE — 36415 COLL VENOUS BLD VENIPUNCTURE: CPT | Performed by: FAMILY MEDICINE

## 2020-10-15 RX ORDER — ATORVASTATIN CALCIUM 20 MG/1
20 TABLET, FILM COATED ORAL DAILY
Qty: 90 TABLET | Refills: 4 | Status: SHIPPED | OUTPATIENT
Start: 2020-10-15 | End: 2021-11-17

## 2020-10-15 ASSESSMENT — PAIN SCALES - GENERAL: PAINLEVEL: NO PAIN (0)

## 2020-10-15 ASSESSMENT — MIFFLIN-ST. JEOR: SCORE: 1882.53

## 2020-10-15 NOTE — PROGRESS NOTES
3  SUBJECTIVE:   CC: Fercho Phillips is an 50 year old male who presents for preventive health visit.     Patient has been advised of split billing requirements and indicates understanding: Yes     Healthy Habits:    Do you get at least three servings of calcium containing foods daily (dairy, green leafy vegetables, etc.)? yes    Amount of exercise or daily activities, outside of work: 1 hour(s) per day    Problems taking medications regularly No    Medication side effects: No    Have you had an eye exam in the past two years? no    Do you see a dentist twice per year? no    Do you have sleep apnea, excessive snoring or daytime drowsiness?no      Today's PHQ-2 Score:   PHQ-2 ( 1999 [employer]) 10/15/2020 1/16/2020   Q1: Little interest or pleasure in doing things 0 0   Q2: Feeling down, depressed or hopeless 0 0   PHQ-2 Score 0 0     Abuse: Current or Past(Physical, Sexual or Emotional)- No  Do you feel safe in your environment? Yes        Social History     Tobacco Use     Smoking status: Never Smoker     Smokeless tobacco: Never Used   Substance Use Topics     Alcohol use: Yes     Comment: 2-3 beers per week     If you drink alcohol do you typically have >3 drinks per day or >7 drinks per week? No                      Last PSA:   PSA   Date Value Ref Range Status   09/15/2016 0.70 0 - 4 ug/L Final     Comment:     Assay Method:  Chemiluminescence using Siemens Vista analyzer       Reviewed orders with patient. Reviewed health maintenance and updated orders accordingly - Yes  BP Readings from Last 3 Encounters:   10/15/20 132/84   02/11/20 125/86   01/16/20 130/80    Wt Readings from Last 3 Encounters:   10/15/20 96.1 kg (211 lb 12.8 oz)   02/11/20 94.8 kg (209 lb 1.6 oz)   01/16/20 94.5 kg (208 lb 4.8 oz)                  Patient Active Problem List   Diagnosis     HYPERLIPIDEMIA LDL GOAL <130     Achilles tendon tear     Abnormality of gait     Elevated LFTs     Health Care Home     Past Surgical History:   Procedure  Laterality Date     COLONOSCOPY N/A 1/11/2016    Procedure: COLONOSCOPY;  Surgeon: Kumar Nunez MD;  Location: [facility]     Left shoulder scope      Left shoulder scope     Right elbow repair      Right elbow repair     Right thumb repair      Right thumb repair       Social History     Tobacco Use     Smoking status: Never Smoker     Smokeless tobacco: Never Used   Substance Use Topics     Alcohol use: Yes     Comment: 2-3 beers per week     Family History   Problem Relation Age of Onset     Heart Disease Father         heart disease, angioplasty with stent     Cancer - colorectal Father      C.A.D. Father      Breast Cancer Mother          Current Outpatient Medications   Medication Sig Dispense Refill     ASPIRIN 81 MG OR TABS 1 TABLET DAILY       atorvastatin (LIPITOR) 20 MG tablet Take 1 tablet (20 mg) by mouth daily 90 tablet 3     Cholecalciferol (VITAMIN D3 PO) Take 1 tablet by mouth daily.       cinnamon 500 MG TABS Take 1 tablet by mouth daily.       Coenzyme Q10 (CO Q 10) 10 MG CAPS Take  by mouth.       Flaxseed, Linseed, (FLAX SEED OIL) 1000 MG capsule Take 1 capsule by mouth daily.       Garlic 1000 MG CAPS Take 1 tablet by mouth daily.       ibuprofen (ADVIL,MOTRIN) 200 MG tablet Take 2 tablets by mouth daily as needed.       Multiple Vitamin (MULTI-VITAMIN) per tablet Take 1 tablet by mouth daily.       NIACIN PO Take 1 tablet by mouth daily       Nutritional Supplements (FRUIT & VEGETABLE DAILY PO) Take 1 capsule by mouth daily.       Omega-3 Fatty Acids (FISH OIL PO) Take 1 capsule by mouth daily.       Resveratrol 100 MG CAPS Take 1 capsule by mouth daily.       UNABLE TO FIND Take 1 tablet by mouth daily. MEDICATION NAME: L-Anginine & L-Citrulline  500mg       No Known Allergies    Reviewed and updated as needed this visit by clinical staff  Tobacco  Allergies  Meds   Med Hx  Surg Hx  Fam Hx  Soc Hx        Reviewed and updated as needed this visit by Provider               "    ROS:  CONSTITUTIONAL: NEGATIVE for fever, chills, change in weight  INTEGUMENTARY/SKIN: NEGATIVE for worrisome rashes, moles or lesions  EYES: NEGATIVE for vision changes or irritation  ENT: NEGATIVE for ear, mouth and throat problems  RESP: NEGATIVE for significant cough or SOB  CV: NEGATIVE for chest pain, palpitations or peripheral edema  GI: NEGATIVE for nausea, abdominal pain, heartburn, or change in bowel habits   male: negative for dysuria, hematuria, decreased urinary stream, erectile dysfunction, urethral discharge  MUSCULOSKELETAL: NEGATIVE for significant arthralgias or myalgia  NEURO: NEGATIVE for weakness, dizziness or paresthesias  PSYCHIATRIC: NEGATIVE for changes in mood or affect    OBJECTIVE:   /84   Pulse 56   Temp 97  F (36.1  C) (Tympanic)   Resp 16   Ht 1.867 m (6' 1.5\")   Wt 96.1 kg (211 lb 12.8 oz)   BMI 27.56 kg/m    EXAM:  GENERAL: healthy, alert and no distress  NECK: no adenopathy, no asymmetry, masses, or scars and thyroid normal to palpation  RESP: lungs clear to auscultation - no rales, rhonchi or wheezes  CV: regular rate and rhythm, normal S1 S2, no S3 or S4, no murmur, click or rub, no peripheral edema and peripheral pulses strong  ABDOMEN: soft, nontender, no hepatosplenomegaly, no masses and bowel sounds normal  MS: no gross musculoskeletal defects noted, no edema    Diagnostic Test Results:  Labs reviewed in Epic    ASSESSMENT/PLAN:   1. Routine general medical examination at a health care facility      2. Encounter for routine adult medical exam with abnormal findings      3. Screening for HIV (human immunodeficiency virus)      4. Screening for hyperlipidemia elevated coronary ct      Patient has been advised of split billing requirements and indicates understanding: Yes  COUNSELING:  Reviewed preventive health counseling, as reflected in patient instructions       Regular exercise       Healthy diet/nutrition       Vision screening       Hearing " "screening    Estimated body mass index is 27.56 kg/m  as calculated from the following:    Height as of this encounter: 1.867 m (6' 1.5\").    Weight as of this encounter: 96.1 kg (211 lb 12.8 oz).    Weight management plan: Discussed healthy diet and exercise guidelines    He reports that he has never smoked. He has never used smokeless tobacco.    Counseling Resources:  ATP IV Guidelines  Pooled Cohorts Equation Calculator  FRAX Risk Assessment  ICSI Preventive Guidelines  Dietary Guidelines for Americans, 2010  USDA's MyPlate  ASA Prophylaxis  Lung CA Screening    Ranulfo Najera MD  [facility]  "

## 2020-10-16 LAB — HIV 1+2 AB+HIV1 P24 AG SERPL QL IA: NONREACTIVE

## 2020-11-02 ENCOUNTER — VIRTUAL VISIT (OUTPATIENT)
Dept: CARDIOLOGY | Facility: CLINIC | Age: 50
End: 2020-11-02
Payer: COMMERCIAL

## 2020-11-02 VITALS
SYSTOLIC BLOOD PRESSURE: 119 MMHG | DIASTOLIC BLOOD PRESSURE: 75 MMHG | HEART RATE: 55 BPM | BODY MASS INDEX: 26.68 KG/M2 | WEIGHT: 205 LBS

## 2020-11-02 DIAGNOSIS — E78.5 HYPERLIPIDEMIA LDL GOAL <100: ICD-10-CM

## 2020-11-02 DIAGNOSIS — R93.1 ELEVATED CORONARY ARTERY CALCIUM SCORE: ICD-10-CM

## 2020-11-02 DIAGNOSIS — Z82.49 FAMILY HISTORY OF HEART DISEASE: ICD-10-CM

## 2020-11-02 PROCEDURE — 99213 OFFICE O/P EST LOW 20 MIN: CPT | Mod: 95 | Performed by: INTERNAL MEDICINE

## 2020-11-02 NOTE — PROGRESS NOTES
"Fercho Phillips is a 50 year old male who is being evaluated via a billable telephone visit.      The patient has been notified of following:     \"This telephone visit will be conducted via a call between you and your physician/provider. We have found that certain health care needs can be provided without the need for a physical exam.  This service lets us provide the care you need with a short phone conversation.  If a prescription is necessary we can send it directly to your pharmacy.  If lab work is needed we can place an order for that and you can then stop by our lab to have the test done at a later time.    Telephone visits are billed at different rates depending on your insurance coverage. During this emergency period, for some insurers they may be billed the same as an in-person visit.  Please reach out to your insurance provider with any questions.    If during the course of the call the physician/provider feels a telephone visit is not appropriate, you will not be charged for this service.\"    Patient has given verbal consent for Telephone visit?  Yes    What phone number would you like to be contacted at? [phone]    How would you like to obtain your AVS? Mail a copy    Phone call duration: 14 minutes    Stephane Baker MD      Cardiology Clinic    Assessment & Plan      1.  Elevated calcium score  2.  Strong family history of early premature coronary artery disease  3.  Orthopedic injuries  4.  Normal EKG  5.  Negative Stress echocardiogram for ischemia  6.  Hypertensive BP response to exercise (15 METS)    Recommendations    1.  Went over prior cardiac testing   2.  Reviewed BP reading from home.  Normotensive with SBP in 110`s.    3.  Reduce salt intake  4.  DASH diet  5.  Keep BP diary with measurements ~ 1-2 X week  6.  LDL has crept up to 92.  Will try TLC and if still elevated next year will increase Lipitor.    7.  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  He presented in 2019 for a primary preventive visit.  He states that his father had a PCI performed at the age of 36.  He is also had a bypass in his 60s.  He has a twin brother who also has hyperlipidemia which runs in the family.  He does not have any specific cardiac complaints however due to early premature coronary artery disease in his family he underwent a calcium score at an outside facility.  This demonstrated a calcium score of 171.  As a result he presented to establish local cardiac care.  Cholesterol was checked and his LDL was elevated.  He has been on simvastatin for many years however it was advised that he should switch over to Lipitor due to not meeting target goals.  Patient had an EKG performed demonstrating normal sinus rhythm.  He does not have any other additional medical issues with the exception of orthopedic injuries in the past.  Lifelong non-smoker    Stress echo  Target Heart Rate was achieved.  The EKG portion of this stress test was negative for inducible ischemia (see  echo results below).  There was a hypertensive BP response to exercise.  Normal left ventricular function and wall motion at rest and post-stress.        Stephane Baker MD    Primary Care Physician   Ranulfo Pocahontas      Patient Active Problem List   Diagnosis     HYPERLIPIDEMIA LDL GOAL <130     Achilles tendon tear     Abnormality of gait     Elevated LFTs     Health Care Home       Past Medical History   I have reviewed this patient's medical history and updated it with pertinent information if needed.   No past medical history on file.    Past Surgical History   I have reviewed this patient's surgical history and updated it with pertinent information if needed.  Past Surgical History:   Procedure Laterality Date     COLONOSCOPY N/A 1/11/2016    Procedure: COLONOSCOPY;  Surgeon: Kumar Nunez  MD IKE;  Location: [facility]     Left shoulder scope      Left shoulder scope     Right elbow repair      Right elbow repair     Right thumb repair      Right thumb repair       Prior to Admission Medications   Cannot display prior to admission medications because the patient has not been admitted in this contact.     [unfilled]  [unfilled]  Allergies   No Known Allergies    Social History    reports that he has never smoked. He has never used smokeless tobacco. He reports current alcohol use. He reports that he does not use drugs.    Family History   Family History   Problem Relation Age of Onset     Heart Disease Father         heart disease, angioplasty with stent     Cancer - colorectal Father      C.A.D. Father      Breast Cancer Mother        Review of Systems   The comprehensive 10 point Review of Systems is negative other than noted in the HPI or here.     Physical Exam   Vital Signs with Ranges  Pulse:  [55] 55  BP: (119)/(75) 119/75  Wt Readings from Last 4 Encounters:   11/02/20 93 kg (205 lb)   10/15/20 96.1 kg (211 lb 12.8 oz)   02/11/20 94.8 kg (209 lb 1.6 oz)   01/16/20 94.5 kg (208 lb 4.8 oz)

## 2020-11-02 NOTE — LETTER
"11/2/2020    Ranulfo Najera MD  [address]  [place] [address]    RE: Fercho Phillips       Dear Colleague,    I had the pleasure of seeing Fercho Phillips in the [facility].    Fercho Phillips is a 50 year old male who is being evaluated via a billable telephone visit.      The patient has been notified of following:     \"This telephone visit will be conducted via a call between you and your physician/provider. We have found that certain health care needs can be provided without the need for a physical exam.  This service lets us provide the care you need with a short phone conversation.  If a prescription is necessary we can send it directly to your pharmacy.  If lab work is needed we can place an order for that and you can then stop by our lab to have the test done at a later time.    Telephone visits are billed at different rates depending on your insurance coverage. During this emergency period, for some insurers they may be billed the same as an in-person visit.  Please reach out to your insurance provider with any questions.    If during the course of the call the physician/provider feels a telephone visit is not appropriate, you will not be charged for this service.\"    Patient has given verbal consent for Telephone visit?  Yes    What phone number would you like to be contacted at? [phone]    How would you like to obtain your AVS? Mail a copy    Phone call duration: 14 minutes    Stephane Baker MD      Cardiology Clinic    Assessment & Plan      1.  Elevated calcium score  2.  Strong family history of early premature coronary artery disease  3.  Orthopedic injuries  4.  Normal EKG  5.  Negative Stress echocardiogram for ischemia  6.  Hypertensive BP response to exercise (15 METS)    Recommendations    1.  Went over prior cardiac testing   2.  Reviewed BP reading from home.  Normotensive with SBP in 110`s.    3.  Reduce salt intake  4.  DASH diet  5.  Keep BP diary " with measurements ~ 1-2 X week  6.  LDL has crept up to 92.  Will try TLC and if still elevated next year will increase Lipitor.    7.  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  He presented in 2019 for a primary preventive visit.  He states that his father had a PCI performed at the age of 36.  He is also had a bypass in his 60s.  He has a twin brother who also has hyperlipidemia which runs in the family.  He does not have any specific cardiac complaints however due to early premature coronary artery disease in his family he underwent a calcium score at an outside facility.  This demonstrated a calcium score of 171.  As a result he presented to establish local cardiac care.  Cholesterol was checked and his LDL was elevated.  He has been on simvastatin for many years however it was advised that he should switch over to Lipitor due to not meeting target goals.  Patient had an EKG performed demonstrating normal sinus rhythm.  He does not have any other additional medical issues with the exception of orthopedic injuries in the past.  Lifelong non-smoker    Stress echo  Target Heart Rate was achieved.  The EKG portion of this stress test was negative for inducible ischemia (see  echo results below).  There was a hypertensive BP response to exercise.  Normal left ventricular function and wall motion at rest and post-stress.        Stephane Baker MD    Primary Care Physician   Ranulfo Najera      Patient Active Problem List   Diagnosis     HYPERLIPIDEMIA LDL GOAL <130     Achilles tendon tear     Abnormality of gait     Elevated LFTs     Health Care Home       Past Medical History   I have reviewed this patient's medical history and updated it with pertinent information if needed.   No past medical history on file.    Past Surgical History   I have reviewed this  patient's surgical history and updated it with pertinent information if needed.  Past Surgical History:   Procedure Laterality Date     COLONOSCOPY N/A 1/11/2016    Procedure: COLONOSCOPY;  Surgeon: Kumar Nunez MD;  Location: [facility]     Left shoulder scope      Left shoulder scope     Right elbow repair      Right elbow repair     Right thumb repair      Right thumb repair       Prior to Admission Medications   Cannot display prior to admission medications because the patient has not been admitted in this contact.     [unfilled]  [unfilled]  Allergies   No Known Allergies    Social History    reports that he has never smoked. He has never used smokeless tobacco. He reports current alcohol use. He reports that he does not use drugs.    Family History   Family History   Problem Relation Age of Onset     Heart Disease Father         heart disease, angioplasty with stent     Cancer - colorectal Father      C.A.D. Father      Breast Cancer Mother        Review of Systems   The comprehensive 10 point Review of Systems is negative other than noted in the HPI or here.     Physical Exam   Vital Signs with Ranges  Pulse:  [55] 55  BP: (119)/(75) 119/75  Wt Readings from Last 4 Encounters:   11/02/20 93 kg (205 lb)   10/15/20 96.1 kg (211 lb 12.8 oz)   02/11/20 94.8 kg (209 lb 1.6 oz)   01/16/20 94.5 kg (208 lb 4.8 oz)       Thank you for allowing me to participate in the care of your patient.    Sincerely,     Stephane Baker MD     [facility]

## 2021-02-25 ENCOUNTER — OFFICE VISIT (OUTPATIENT)
Dept: FAMILY MEDICINE | Facility: CLINIC | Age: 51
End: 2021-02-25
Payer: COMMERCIAL

## 2021-02-25 VITALS
BODY MASS INDEX: 27.52 KG/M2 | HEIGHT: 74 IN | TEMPERATURE: 97.9 F | WEIGHT: 214.4 LBS | HEART RATE: 62 BPM | RESPIRATION RATE: 16 BRPM | SYSTOLIC BLOOD PRESSURE: 131 MMHG | DIASTOLIC BLOOD PRESSURE: 77 MMHG

## 2021-02-25 DIAGNOSIS — N39.8 DYSFUNCTIONAL VOIDING OF URINE: ICD-10-CM

## 2021-02-25 DIAGNOSIS — Z12.11 ENCOUNTER FOR COLORECTAL CANCER SCREENING: Primary | ICD-10-CM

## 2021-02-25 DIAGNOSIS — S39.011A STRAIN OF ABDOMINAL WALL, INITIAL ENCOUNTER: ICD-10-CM

## 2021-02-25 DIAGNOSIS — Z12.12 ENCOUNTER FOR COLORECTAL CANCER SCREENING: Primary | ICD-10-CM

## 2021-02-25 LAB
ALBUMIN UR-MCNC: NEGATIVE MG/DL
APPEARANCE UR: CLEAR
BILIRUB UR QL STRIP: NEGATIVE
COLOR UR AUTO: YELLOW
GLUCOSE UR STRIP-MCNC: NEGATIVE MG/DL
HGB UR QL STRIP: NEGATIVE
KETONES UR STRIP-MCNC: ABNORMAL MG/DL
LEUKOCYTE ESTERASE UR QL STRIP: NEGATIVE
NITRATE UR QL: NEGATIVE
PH UR STRIP: 6 PH (ref 5–7)
SOURCE: ABNORMAL
SP GR UR STRIP: 1.02 (ref 1–1.03)
UROBILINOGEN UR STRIP-ACNC: 0.2 EU/DL (ref 0.2–1)

## 2021-02-25 PROCEDURE — 81003 URINALYSIS AUTO W/O SCOPE: CPT | Performed by: FAMILY MEDICINE

## 2021-02-25 PROCEDURE — 99213 OFFICE O/P EST LOW 20 MIN: CPT | Performed by: FAMILY MEDICINE

## 2021-02-25 ASSESSMENT — PAIN SCALES - GENERAL: PAINLEVEL: NO PAIN (0)

## 2021-02-25 ASSESSMENT — MIFFLIN-ST. JEOR: SCORE: 1894.32

## 2021-02-25 NOTE — PROGRESS NOTES
SUBJECTIVE:                                                    Fercho Phillips is a 50 year old male who presents to clinic today for the following health issues:    Concern - pulled muscle in stomach  Onset: 2-3 weeks    Description:   Where his abs attach to his pelvis and sometimes radiates to his sides. He says it is intermittent. He says he was playing flag football and he felt it when he was running.     Intensity: moderate    Progression of Symptoms:  improving    Accompanying Signs & Symptoms:  none    Previous history of similar problem:   none    Precipitating factors:   Worsened by: running. He slipped on the ice a couple times since it initially happened and he thinks that reaggregated it.    Alleviating factors:  Improved by: ibuprofen    Therapies Tried and outcome: He has used ibuprofen and he says that has helped a little bit    Problem list and histories reviewed & adjusted, as indicated.  Additional history:     Patient Active Problem List   Diagnosis     HYPERLIPIDEMIA LDL GOAL <130     Achilles tendon tear     Abnormality of gait     Elevated LFTs     Health Care Home     Past Surgical History:   Procedure Laterality Date     COLONOSCOPY N/A 1/11/2016    Procedure: COLONOSCOPY;  Surgeon: Kumar Nunez MD;  Location: [facility]     Left shoulder scope      Left shoulder scope     Right elbow repair      Right elbow repair     Right thumb repair      Right thumb repair       Social History     Tobacco Use     Smoking status: Never Smoker     Smokeless tobacco: Never Used   Substance Use Topics     Alcohol use: Yes     Comment: 2-3 beers per week     Family History   Problem Relation Age of Onset     Heart Disease Father         heart disease, angioplasty with stent     Cancer - colorectal Father      C.A.D. Father      Breast Cancer Mother          Current Outpatient Medications   Medication Sig Dispense Refill     ASPIRIN 81 MG OR TABS 1 TABLET DAILY       atorvastatin (LIPITOR) 20 MG tablet Take 1  "tablet (20 mg) by mouth daily 90 tablet 4     Cholecalciferol (VITAMIN D3 PO) Take 1 tablet by mouth daily.       cinnamon 500 MG TABS Take 1 tablet by mouth daily.       Coenzyme Q10 (CO Q 10) 10 MG CAPS Take  by mouth.       Flaxseed, Linseed, (FLAX SEED OIL) 1000 MG capsule Take 1 capsule by mouth daily.       Garlic 1000 MG CAPS Take 1 tablet by mouth daily.       ibuprofen (ADVIL,MOTRIN) 200 MG tablet Take 2 tablets by mouth daily as needed.       Multiple Vitamin (MULTI-VITAMIN) per tablet Take 1 tablet by mouth daily.       NIACIN PO Take 1 tablet by mouth daily       Nutritional Supplements (FRUIT & VEGETABLE DAILY PO) Take 1 capsule by mouth daily.       Omega-3 Fatty Acids (FISH OIL PO) Take 1 capsule by mouth daily.       Resveratrol 100 MG CAPS Take 1 capsule by mouth daily.       UNABLE TO FIND Take 1 tablet by mouth daily. MEDICATION NAME: L-Anginine & L-Citrulline  500mg       No Known Allergies    ROS:  Constitutional, HEENT, cardiovascular, pulmonary, gi and gu systems are negative, except as otherwise noted.    OBJECTIVE:                                                    /77   Pulse 62   Temp 97.9  F (36.6  C) (Tympanic)   Resp 16   Ht 1.867 m (6' 1.5\")   Wt 97.3 kg (214 lb 6.4 oz)   BMI 27.90 kg/m   Body mass index is 27.9 kg/m .   GENERAL: healthy, alert, well nourished, well hydrated, no distress  HENT: ear canals- normal; TMs- normal; Nose- normal; Mouth- no ulcers, no lesions  NECK: no tenderness, no adenopathy, no asymmetry, no masses, no stiffness; thyroid- normal to palpation  RESP: lungs clear to auscultation - no rales, no rhonchi, no wheezes  CV: regular rates and rhythm, normal S1 S2, no S3 or S4 and no murmur, no click or rub -  ABDOMEN: soft, no tenderness, no  hepatosplenomegaly, no masses, normal bowel sounds       ASSESSMENT/PLAN:                                                        (N39.8) Dysfunctional voiding of urine  Plan: *UA reflex to Microscopic and Culture " ([place]         and [facility] (except [place] and         [place])          (S39.011A) Strain of abdominal wall, initial encounter  Ne hernia, or sports hernia at this time will have him do a longer warmup period beforre paractie. Games    (Z12.11,  Z12.12) Encounter for colorectal cancer screening  (primary encounter diagnosis)    Plan: GASTROENTEROLOGY ADULT REF PROCEDURE ONLY         reports that he has never smoked. He has never used smokeless tobacco.      Weight management plan: Discussed healthy diet and exercise guidelines      [facility] MARQUITA

## 2021-03-13 DIAGNOSIS — Z11.59 ENCOUNTER FOR SCREENING FOR OTHER VIRAL DISEASES: ICD-10-CM

## 2021-03-25 ENCOUNTER — ANESTHESIA EVENT (OUTPATIENT)
Dept: GASTROENTEROLOGY | Facility: CLINIC | Age: 51
End: 2021-03-25
Payer: COMMERCIAL

## 2021-03-25 NOTE — ANESTHESIA PREPROCEDURE EVALUATION
Anesthesia Pre-Procedure Evaluation    Patient: Fercho Phillips   MRN: [id number] : 1970        Preoperative Diagnosis: Encounter for colorectal cancer screening [Z12.11, Z12.12]   Procedure : Procedure(s):  COLONOSCOPY     History reviewed. No pertinent past medical history.   Past Surgical History:   Procedure Laterality Date     COLONOSCOPY N/A 2016    Procedure: COLONOSCOPY;  Surgeon: Kumar Nunez MD;  Location: [facility]     Left shoulder scope      Left shoulder scope     ORTHOPEDIC SURGERY       Right elbow repair      Right elbow repair     Right thumb repair      Right thumb repair      No Known Allergies   Social History     Tobacco Use     Smoking status: Never Smoker     Smokeless tobacco: Never Used   Substance Use Topics     Alcohol use: Yes     Comment: 2-3 beers per week      Wt Readings from Last 1 Encounters:   21 97.3 kg (214 lb 6.4 oz)        Anesthesia Evaluation   Pt has had prior anesthetic. Type: MAC and General.    No history of anesthetic complications       ROS/MED HX  ENT/Pulmonary:  - neg pulmonary ROS     Neurologic:  - neg neurologic ROS     Cardiovascular:     (+) Dyslipidemia -----Previous cardiac testing   Echo: Date: Results:    Stress Test: Date: 3-2019 Results:  Interpretation Summary  Target Heart Rate was achieved.  The EKG portion of this stress test was negative for inducible ischemia (see  echo results below).  There was a hypertensive BP response to exercise.  Normal left ventricular function and wall motion at rest and post-stress.  ECG Reviewed: Date: Results:    Cath: Date: Results:      METS/Exercise Tolerance: >4 METS    Hematologic:  - neg hematologic  ROS     Musculoskeletal:  - neg musculoskeletal ROS     GI/Hepatic:  - neg GI/hepatic ROS     Renal/Genitourinary:  - neg Renal ROS     Endo:  - neg endo ROS     Psychiatric/Substance Use:  - neg psychiatric ROS     Infectious Disease:  - neg infectious disease ROS     Malignancy:  - neg malignancy ROS      Other:            Physical Exam    Airway  airway exam normal      Mallampati: I   TM distance: > 3 FB   Neck ROM: full   Mouth opening: > 3 cm    Respiratory Devices and Support         Dental  no notable dental history         Cardiovascular   cardiovascular exam normal       Rhythm and rate: regular and normal     Pulmonary   pulmonary exam normal        breath sounds clear to auscultation           OUTSIDE LABS:  CBC:   Lab Results   Component Value Date    WBC 5.3 02/11/2020    HGB 15.9 02/11/2020    HCT 46.8 02/11/2020     02/11/2020     BMP:   Lab Results   Component Value Date     02/11/2020     10/08/2018    POTASSIUM 4.3 02/11/2020    POTASSIUM 4.1 10/08/2018    CHLORIDE 104 02/11/2020    CHLORIDE 104 10/08/2018    CO2 31 02/11/2020    CO2 26 10/08/2018    BUN 17 02/11/2020    BUN 13 10/08/2018    CR 0.94 02/11/2020    CR 0.97 10/08/2018    GLC 96 10/15/2020    GLC 94 02/11/2020     COAGS: No results found for: PTT, INR, FIBR  POC: No results found for: BGM, HCG, HCGS  HEPATIC:   Lab Results   Component Value Date    ALBUMIN 4.3 10/08/2018    PROTTOTAL 7.4 10/08/2018    ALT 34 04/05/2019    AST 25 10/08/2018    ALKPHOS 60 10/08/2018    BILITOTAL 1.2 10/08/2018     OTHER:   Lab Results   Component Value Date    ANAYELI 8.9 02/11/2020       Anesthesia Plan    ASA Status:  1   NPO Status:  NPO Appropriate    Anesthesia Type: General.   Induction: Intravenous, Propofol.   Maintenance: TIVA.        Consents    Anesthesia Plan(s) and associated risks, benefits, and realistic alternatives discussed. Questions answered and patient/representative(s) expressed understanding.     - Discussed with:  Patient         Postoperative Care       PONV prophylaxis: Ondansetron (or other 5HT-3)     Comments:                TG Mathias CRNA

## 2021-03-26 DIAGNOSIS — Z11.59 ENCOUNTER FOR SCREENING FOR OTHER VIRAL DISEASES: ICD-10-CM

## 2021-03-26 DIAGNOSIS — E78.5 HYPERLIPIDEMIA LDL GOAL <100: ICD-10-CM

## 2021-03-26 LAB
SARS-COV-2 RNA RESP QL NAA+PROBE: NORMAL
SPECIMEN SOURCE: NORMAL

## 2021-03-26 PROCEDURE — U0005 INFEC AGEN DETEC AMPLI PROBE: HCPCS | Performed by: SURGERY

## 2021-03-26 PROCEDURE — U0003 INFECTIOUS AGENT DETECTION BY NUCLEIC ACID (DNA OR RNA); SEVERE ACUTE RESPIRATORY SYNDROME CORONAVIRUS 2 (SARS-COV-2) (CORONAVIRUS DISEASE [COVID-19]), AMPLIFIED PROBE TECHNIQUE, MAKING USE OF HIGH THROUGHPUT TECHNOLOGIES AS DESCRIBED BY CMS-2020-01-R: HCPCS | Performed by: SURGERY

## 2021-03-27 LAB
LABORATORY COMMENT REPORT: NORMAL
SARS-COV-2 RNA RESP QL NAA+PROBE: NEGATIVE
SPECIMEN SOURCE: NORMAL

## 2021-03-29 ENCOUNTER — HOSPITAL ENCOUNTER (OUTPATIENT)
Facility: CLINIC | Age: 51
Discharge: HOME | End: 2021-03-29
Attending: SURGERY | Admitting: SURGERY
Payer: COMMERCIAL

## 2021-03-29 ENCOUNTER — ANESTHESIA (OUTPATIENT)
Dept: GASTROENTEROLOGY | Facility: CLINIC | Age: 51
End: 2021-03-29
Payer: COMMERCIAL

## 2021-03-29 VITALS
SYSTOLIC BLOOD PRESSURE: 112 MMHG | DIASTOLIC BLOOD PRESSURE: 78 MMHG | HEART RATE: 65 BPM | BODY MASS INDEX: 26.56 KG/M2 | OXYGEN SATURATION: 92 % | WEIGHT: 207 LBS | TEMPERATURE: 98.6 F | HEIGHT: 74 IN

## 2021-03-29 LAB — COLONOSCOPY: NORMAL

## 2021-03-29 PROCEDURE — 250N000011 HC RX IP 250 OP 636: Performed by: NURSE ANESTHETIST, CERTIFIED REGISTERED

## 2021-03-29 PROCEDURE — 258N000003 HC RX IP 258 OP 636: Performed by: SURGERY

## 2021-03-29 PROCEDURE — 250N000009 HC RX 250: Performed by: SURGERY

## 2021-03-29 PROCEDURE — G0105 COLORECTAL SCRN; HI RISK IND: HCPCS | Performed by: SURGERY

## 2021-03-29 PROCEDURE — 370N000017 HC ANESTHESIA TECHNICAL FEE, PER MIN: Performed by: SURGERY

## 2021-03-29 PROCEDURE — 250N000009 HC RX 250: Performed by: NURSE ANESTHETIST, CERTIFIED REGISTERED

## 2021-03-29 PROCEDURE — 45378 DIAGNOSTIC COLONOSCOPY: CPT | Performed by: SURGERY

## 2021-03-29 RX ORDER — LIDOCAINE HYDROCHLORIDE 10 MG/ML
INJECTION, SOLUTION INFILTRATION; PERINEURAL PRN
Status: DISCONTINUED | OUTPATIENT
Start: 2021-03-29 | End: 2021-03-29

## 2021-03-29 RX ORDER — PROPOFOL 10 MG/ML
INJECTION, EMULSION INTRAVENOUS PRN
Status: DISCONTINUED | OUTPATIENT
Start: 2021-03-29 | End: 2021-03-29

## 2021-03-29 RX ORDER — SODIUM CHLORIDE, SODIUM LACTATE, POTASSIUM CHLORIDE, CALCIUM CHLORIDE 600; 310; 30; 20 MG/100ML; MG/100ML; MG/100ML; MG/100ML
INJECTION, SOLUTION INTRAVENOUS CONTINUOUS
Status: DISCONTINUED | OUTPATIENT
Start: 2021-03-29 | End: 2021-03-29 | Stop reason: HOSPADM

## 2021-03-29 RX ORDER — PROPOFOL 10 MG/ML
INJECTION, EMULSION INTRAVENOUS CONTINUOUS PRN
Status: DISCONTINUED | OUTPATIENT
Start: 2021-03-29 | End: 2021-03-29

## 2021-03-29 RX ORDER — ONDANSETRON 2 MG/ML
4 INJECTION INTRAMUSCULAR; INTRAVENOUS
Status: DISCONTINUED | OUTPATIENT
Start: 2021-03-29 | End: 2021-03-29 | Stop reason: HOSPADM

## 2021-03-29 RX ORDER — LIDOCAINE 40 MG/G
CREAM TOPICAL
Status: DISCONTINUED | OUTPATIENT
Start: 2021-03-29 | End: 2021-03-29 | Stop reason: HOSPADM

## 2021-03-29 RX ADMIN — PROPOFOL 100 MG: 10 INJECTION, EMULSION INTRAVENOUS at 09:49

## 2021-03-29 RX ADMIN — PROPOFOL 200 MCG/KG/MIN: 10 INJECTION, EMULSION INTRAVENOUS at 09:49

## 2021-03-29 RX ADMIN — SODIUM CHLORIDE, POTASSIUM CHLORIDE, SODIUM LACTATE AND CALCIUM CHLORIDE: 600; 310; 30; 20 INJECTION, SOLUTION INTRAVENOUS at 09:40

## 2021-03-29 RX ADMIN — LIDOCAINE HYDROCHLORIDE 0.1 ML: 10 INJECTION, SOLUTION EPIDURAL; INFILTRATION; INTRACAUDAL; PERINEURAL at 09:40

## 2021-03-29 RX ADMIN — LIDOCAINE HYDROCHLORIDE 40 MG: 10 INJECTION, SOLUTION INFILTRATION; PERINEURAL at 09:49

## 2021-03-29 ASSESSMENT — MIFFLIN-ST. JEOR: SCORE: 1860.76

## 2021-03-29 NOTE — ANESTHESIA CARE TRANSFER NOTE
Patient: Fercho Phillips    Procedure(s):  COLONOSCOPY    Diagnosis: Encounter for colorectal cancer screening [Z12.11, Z12.12]  Diagnosis Additional Information: No value filed.    Anesthesia Type:   General     Note:      Level of Consciousness: drowsy  Oxygen Supplementation: nasal cannula    Independent Airway: airway patency satisfactory and stable  Dentition: dentition unchanged  Vital Signs Stable: post-procedure vital signs reviewed and stable  Report to RN Given: handoff report given  Patient transferred to: Phase II          Vitals: (Last set prior to Anesthesia Care Transfer)  CRNA VITALS  3/29/2021 0932 - 3/29/2021 1002      3/29/2021             Pulse:  69    SpO2:  98 %        Electronically Signed By: Louis Kelly CRNA, APRN CRNA  March 29, 2021  10:02 AM

## 2021-03-29 NOTE — ANESTHESIA POSTPROCEDURE EVALUATION
Patient: Fercho Phillips    Procedure(s):  COLONOSCOPY    Diagnosis:Encounter for colorectal cancer screening [Z12.11, Z12.12]  Diagnosis Additional Information: No value filed.    Anesthesia Type:  General    Note:  Disposition: Outpatient   Postop Pain Control: Uneventful            Sign Out: Well controlled pain   PONV: No   Neuro/Psych: Uneventful            Sign Out: Acceptable/Baseline neuro status   Airway/Respiratory: Uneventful            Sign Out: Acceptable/Baseline resp. status   CV/Hemodynamics: Uneventful            Sign Out: Acceptable CV status   Other NRE: NONE   DID A NON-ROUTINE EVENT OCCUR? No         Last vitals:  Vitals:    03/29/21 0911 03/29/21 1005 03/29/21 1015   BP: 132/87 110/79 112/78   Pulse: 84 71 65   Temp: 37.1  C (98.8  F) 37  C (98.6  F)    SpO2: 99% 95% 92%       Last vitals prior to Anesthesia Care Transfer:  CRNA VITALS  3/29/2021 0932 - 3/29/2021 1032      3/29/2021             Pulse:  69    SpO2:  98 %          Electronically Signed By: Louis Kelly CRNA, APRN CRNA  March 29, 2021  10:38 AM

## 2021-03-29 NOTE — H&P
"50 year old year old male here for colonoscopy for screening.    Patient Active Problem List   Diagnosis     HYPERLIPIDEMIA LDL GOAL <130     Achilles tendon tear     Abnormality of gait     Elevated LFTs     Health Care Home       History reviewed. No pertinent past medical history.    Past Surgical History:   Procedure Laterality Date     COLONOSCOPY N/A 1/11/2016    Procedure: COLONOSCOPY;  Surgeon: Kumar Nunez MD;  Location: [facility]     Left shoulder scope      Left shoulder scope     ORTHOPEDIC SURGERY       Right elbow repair      Right elbow repair     Right thumb repair      Right thumb repair       @FMX@    No current outpatient medications on file.       No Known Allergies    Pt reports that he has never smoked. He has never used smokeless tobacco. He reports previous alcohol use. He reports that he does not use drugs.    Exam:  /87 (BP Location: Left arm)   Pulse 84   Temp 98.8  F (37.1  C) (Oral)   Ht 1.867 m (6' 1.5\")   Wt 93.9 kg (207 lb)   SpO2 99%   BMI 26.94 kg/m      Awake, Alert OX3  Lungs - CTA bilaterally  CV - RRR, no murmurs, distal pulses intact  Abd - soft, non-distended, non-tender, +BS  Extr - No cyanosis or edema    A/P 50 year old year old male in need of colonoscopy for screening. Risks, benefits, alternatives, and complications were discussed including the possibility of perforation and the patient agreed to proceed    Kumar Nunez MD     "

## 2021-05-29 ENCOUNTER — RECORDS - HEALTHEAST (OUTPATIENT)
Dept: ADMINISTRATIVE | Facility: CLINIC | Age: 51
End: 2021-05-29

## 2021-09-04 ENCOUNTER — HEALTH MAINTENANCE LETTER (OUTPATIENT)
Age: 51
End: 2021-09-04

## 2021-11-15 DIAGNOSIS — Z82.49 FAMILY HISTORY OF HEART DISEASE: ICD-10-CM

## 2021-11-15 NOTE — TELEPHONE ENCOUNTER
Routing refill request to provider for review/approval because:  Labs not current:  Lipids > year  Patient needs to be seen because:  Due for annual exam    Eric Panchal RN

## 2021-11-17 RX ORDER — ATORVASTATIN CALCIUM 20 MG/1
TABLET, FILM COATED ORAL
Qty: 90 TABLET | Refills: 4 | Status: SHIPPED | OUTPATIENT
Start: 2021-11-17 | End: 2022-04-06

## 2021-12-25 ENCOUNTER — HEALTH MAINTENANCE LETTER (OUTPATIENT)
Age: 51
End: 2021-12-25

## 2022-03-22 DIAGNOSIS — Z11.59 ENCOUNTER FOR HEPATITIS C SCREENING TEST FOR LOW RISK PATIENT: ICD-10-CM

## 2022-03-22 DIAGNOSIS — Z13.6 CARDIOVASCULAR SCREENING; LDL GOAL LESS THAN 130: ICD-10-CM

## 2022-03-22 DIAGNOSIS — R79.89 ELEVATED LFTS: Primary | ICD-10-CM

## 2022-03-22 DIAGNOSIS — Z12.5 SCREENING FOR PROSTATE CANCER: ICD-10-CM

## 2022-03-22 NOTE — PROGRESS NOTES
"Patient coming in for lab work on Thursday, 03/24/22.  Patient is requesting, \" yearly labs, pt wants PSA lab done\". Please place future orders. Thanks    "

## 2022-03-23 ENCOUNTER — TRANSFERRED RECORDS (OUTPATIENT)
Dept: HEALTH INFORMATION MANAGEMENT | Facility: CLINIC | Age: 52
End: 2022-03-23
Payer: COMMERCIAL

## 2022-03-24 ENCOUNTER — LAB (OUTPATIENT)
Dept: LAB | Facility: CLINIC | Age: 52
End: 2022-03-24
Payer: COMMERCIAL

## 2022-03-24 DIAGNOSIS — Z13.6 CARDIOVASCULAR SCREENING; LDL GOAL LESS THAN 130: ICD-10-CM

## 2022-03-24 DIAGNOSIS — Z11.59 ENCOUNTER FOR HEPATITIS C SCREENING TEST FOR LOW RISK PATIENT: ICD-10-CM

## 2022-03-24 DIAGNOSIS — R79.89 ELEVATED LFTS: ICD-10-CM

## 2022-03-24 DIAGNOSIS — Z12.5 SCREENING FOR PROSTATE CANCER: ICD-10-CM

## 2022-03-24 LAB
ALT SERPL W P-5'-P-CCNC: 44 U/L (ref 0–70)
CHOLEST SERPL-MCNC: 151 MG/DL
FASTING STATUS PATIENT QL REPORTED: YES
FASTING STATUS PATIENT QL REPORTED: YES
GLUCOSE BLD-MCNC: 97 MG/DL (ref 70–99)
HDLC SERPL-MCNC: 43 MG/DL
LDLC SERPL CALC-MCNC: 92 MG/DL
NONHDLC SERPL-MCNC: 108 MG/DL
PSA SERPL-MCNC: 0.64 UG/L (ref 0–4)
TRIGL SERPL-MCNC: 80 MG/DL

## 2022-03-24 PROCEDURE — 36415 COLL VENOUS BLD VENIPUNCTURE: CPT

## 2022-03-24 PROCEDURE — 80061 LIPID PANEL: CPT

## 2022-03-24 PROCEDURE — G0103 PSA SCREENING: HCPCS

## 2022-03-24 PROCEDURE — 82947 ASSAY GLUCOSE BLOOD QUANT: CPT

## 2022-03-24 PROCEDURE — 86803 HEPATITIS C AB TEST: CPT

## 2022-03-24 PROCEDURE — 84460 ALANINE AMINO (ALT) (SGPT): CPT

## 2022-03-25 LAB — HCV AB SERPL QL IA: NONREACTIVE

## 2022-04-06 DIAGNOSIS — R93.1 ELEVATED CORONARY ARTERY CALCIUM SCORE: Primary | ICD-10-CM

## 2022-04-06 DIAGNOSIS — Z82.49 FAMILY HISTORY OF HEART DISEASE: ICD-10-CM

## 2022-04-06 RX ORDER — ATORVASTATIN CALCIUM 40 MG/1
40 TABLET, FILM COATED ORAL DAILY
Qty: 90 TABLET | Refills: 1 | Status: SHIPPED | OUTPATIENT
Start: 2022-04-06 | End: 2022-10-05

## 2022-05-04 ENCOUNTER — HOSPITAL ENCOUNTER (OUTPATIENT)
Dept: CARDIOLOGY | Facility: CLINIC | Age: 52
Discharge: HOME | End: 2022-05-04
Attending: INTERNAL MEDICINE | Admitting: INTERNAL MEDICINE
Payer: COMMERCIAL

## 2022-05-04 DIAGNOSIS — R93.1 ELEVATED CORONARY ARTERY CALCIUM SCORE: ICD-10-CM

## 2022-05-04 LAB — LVEF ECHO: NORMAL

## 2022-05-04 PROCEDURE — 93306 TTE W/DOPPLER COMPLETE: CPT | Mod: 26 | Performed by: INTERNAL MEDICINE

## 2022-05-04 PROCEDURE — 93306 TTE W/DOPPLER COMPLETE: CPT

## 2022-05-10 NOTE — PROGRESS NOTES
Cardiology Clinic    Assessment & Plan      1.  Elevated calcium score 186  2.  Strong family history of early premature coronary artery disease  3.  Orthopedic injuries  4.  Normal EKG  5.  Negative Stress echocardiogram for ischemia  6.  Hypertensive BP response to exercise (15 METS)    Recommendations    1.  Went over prior cardiac testing (echo)  2.  Reviewed BP reading from home.  Normotensive with SBP in 110`s.    3.  Reviewed his recent cholesterol and his LDL is at 92.  Agree with the recent change to increase his Lipitor to 40 mg daily I will recheck his cholesterol in 3 months time and he will adjust his diet accordingly.  Our target goal is to try to get the LDL closer to 70 if we can.  If we are not at goal then we need to discuss changing over to Crestor.  4.  Calculated Petersen risk score is 9.9 with the available data.  We will be aggressive from a primary preventive standpoint.  5.  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  He presented in 2019 for a primary preventive visit.  He states that his father had a PCI performed at the age of 36.  He is also had a bypass in his 60s.  He has a twin brother who also has hyperlipidemia which runs in the family.  He does not have any specific cardiac complaints however due to early premature coronary artery disease in his family he underwent a calcium score at an outside facility.  This demonstrated a calcium score of 171.  As a result he presented to establish local cardiac care.  Cholesterol was checked and his LDL was elevated.  He has been on simvastatin for many years however it was advised that he should switch over to Lipitor due to not meeting target goals.  Patient had an EKG performed demonstrating normal sinus rhythm.  He does not have any other additional medical issues with the exception of  orthopedic injuries in the past.  Lifelong non-smoker      Echo 2022  1. The left ventricle is normal in structure, function and size. The visual  ejection fraction is estimated at 60%.  2. The right ventricle is normal in structure, function and size.  3. No valve disease.     No previous echo for comparison.      Stress echo 2019  Target Heart Rate was achieved.  The EKG portion of this stress test was negative for inducible ischemia (see  echo results below).  There was a hypertensive BP response to exercise.  Normal left ventricular function and wall motion at rest and post-stress.        Stephane Baker MD, MD    Primary Care Physician   Ranulfo Najera      Patient Active Problem List   Diagnosis     HYPERLIPIDEMIA LDL GOAL <130     Achilles tendon tear     Abnormality of gait     Elevated LFTs     Health Care Home       Past Medical History   I have reviewed this patient's medical history and updated it with pertinent information if needed.   No past medical history on file.    Past Surgical History   I have reviewed this patient's surgical history and updated it with pertinent information if needed.  Past Surgical History:   Procedure Laterality Date     COLONOSCOPY N/A 1/11/2016    Procedure: COLONOSCOPY;  Surgeon: Kumar Nunez MD;  Location: [facility]     COLONOSCOPY N/A 3/29/2021    Procedure: COLONOSCOPY;  Surgeon: Kumar Nunez MD;  Location: [facility]     Left shoulder scope      Left shoulder scope     ORTHOPEDIC SURGERY       Right elbow repair      Right elbow repair     Right thumb repair      Right thumb repair       Prior to Admission Medications   Cannot display prior to admission medications because the patient has not been admitted in this contact.     [unfilled]  [unfilled]  Allergies   No Known Allergies    Social History    reports that he has never smoked. He has never used smokeless tobacco. He reports previous alcohol use. He reports that he does not use drugs.    Family History    Family History   Problem Relation Age of Onset     Heart Disease Father         heart disease, angioplasty with stent     Cancer - colorectal Father      C.A.D. Father      Breast Cancer Mother        Review of Systems   The comprehensive 10 point Review of Systems is negative other than noted in the HPI or here.     Physical Exam   Vital Signs with Ranges     Wt Readings from Last 4 Encounters:   03/29/21 93.9 kg (207 lb)   02/25/21 97.3 kg (214 lb 6.4 oz)   11/02/20 93 kg (205 lb)   10/15/20 96.1 kg (211 lb 12.8 oz)     GENERAL: Healthy, alert and no distress  EYES: Eyes grossly normal to inspection.  No discharge or erythema, or obvious scleral/conjunctival abnormalities.  RESP: No audible wheeze, cough, or visible cyanosis.  No visible retractions or increased work of breathing.    SKIN: Visible skin clear. No significant rash, abnormal pigmentation or lesions.  NEURO: Cranial nerves grossly intact.  Mentation and speech appropriate for age.  PSYCH: Mentation appears normal, affect normal/bright, judgement and insight intact, normal speech and appearance well-groomed.

## 2022-05-11 ENCOUNTER — OFFICE VISIT (OUTPATIENT)
Dept: CARDIOLOGY | Facility: CLINIC | Age: 52
End: 2022-05-11
Payer: COMMERCIAL

## 2022-05-11 VITALS
HEART RATE: 64 BPM | SYSTOLIC BLOOD PRESSURE: 140 MMHG | OXYGEN SATURATION: 98 % | WEIGHT: 205.8 LBS | TEMPERATURE: 98.1 F | DIASTOLIC BLOOD PRESSURE: 80 MMHG | BODY MASS INDEX: 26.78 KG/M2

## 2022-05-11 DIAGNOSIS — E78.5 HYPERLIPIDEMIA LDL GOAL <100: Primary | ICD-10-CM

## 2022-05-11 PROCEDURE — 99214 OFFICE O/P EST MOD 30 MIN: CPT | Performed by: INTERNAL MEDICINE

## 2022-05-11 RX ORDER — VIT C/B6/B5/MAGNESIUM/HERB 173 50-5-6-5MG
CAPSULE ORAL
COMMUNITY

## 2022-05-11 RX ORDER — BIOTIN 5 MG
TABLET ORAL
COMMUNITY

## 2022-05-11 NOTE — LETTER
5/11/2022    Ranulfo Najera MD  [address]  [place] [address]    RE: Fercho Phillips       Dear Colleague,     I had the pleasure of seeing Fercho Phillips in the [facility].        Cardiology Clinic    Assessment & Plan      1.  Elevated calcium score 186  2.  Strong family history of early premature coronary artery disease  3.  Orthopedic injuries  4.  Normal EKG  5.  Negative Stress echocardiogram for ischemia  6.  Hypertensive BP response to exercise (15 METS)    Recommendations    1.  Went over prior cardiac testing (echo)  2.  Reviewed BP reading from home.  Normotensive with SBP in 110`s.    3.  Reviewed his recent cholesterol and his LDL is at 92.  Agree with the recent change to increase his Lipitor to 40 mg daily I will recheck his cholesterol in 3 months time and he will adjust his diet accordingly.  Our target goal is to try to get the LDL closer to 70 if we can.  If we are not at goal then we need to discuss changing over to Crestor.  4.  Calculated Petersen risk score is 9.9 with the available data.  We will be aggressive from a primary preventive standpoint.  5.  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  He presented in 2019 for a primary preventive visit.  He states that his father had a PCI performed at the age of 36.  He is also had a bypass in his 60s.  He has a twin brother who also has hyperlipidemia which runs in the family.  He does not have any specific cardiac complaints however due to early premature coronary artery disease in his family he underwent a calcium score at an outside facility.  This demonstrated a calcium score of 171.  As a result he presented to establish local cardiac care.  Cholesterol was checked and his LDL was elevated.  He has been on simvastatin for many years however it was advised that he should switch  over to Lipitor due to not meeting target goals.  Patient had an EKG performed demonstrating normal sinus rhythm.  He does not have any other additional medical issues with the exception of orthopedic injuries in the past.  Lifelong non-smoker      Echo 2022  1. The left ventricle is normal in structure, function and size. The visual  ejection fraction is estimated at 60%.  2. The right ventricle is normal in structure, function and size.  3. No valve disease.     No previous echo for comparison.      Stress echo 2019  Target Heart Rate was achieved.  The EKG portion of this stress test was negative for inducible ischemia (see  echo results below).  There was a hypertensive BP response to exercise.  Normal left ventricular function and wall motion at rest and post-stress.        Stephane Baker MD, MD    Primary Care Physician   Ranulfo Najera      Patient Active Problem List   Diagnosis     HYPERLIPIDEMIA LDL GOAL <130     Achilles tendon tear     Abnormality of gait     Elevated LFTs     Health Care Home       Past Medical History   I have reviewed this patient's medical history and updated it with pertinent information if needed.   No past medical history on file.    Past Surgical History   I have reviewed this patient's surgical history and updated it with pertinent information if needed.  Past Surgical History:   Procedure Laterality Date     COLONOSCOPY N/A 1/11/2016    Procedure: COLONOSCOPY;  Surgeon: Kumar Nunez MD;  Location: [facility]     COLONOSCOPY N/A 3/29/2021    Procedure: COLONOSCOPY;  Surgeon: Kumar Nunez MD;  Location: [facility]     Left shoulder scope      Left shoulder scope     ORTHOPEDIC SURGERY       Right elbow repair      Right elbow repair     Right thumb repair      Right thumb repair       Prior to Admission Medications   Cannot display prior to admission medications because the patient has not been admitted in this contact.     [unfilled]  [unfilled]  Allergies   No Known  Allergies    Social History    reports that he has never smoked. He has never used smokeless tobacco. He reports previous alcohol use. He reports that he does not use drugs.    Family History   Family History   Problem Relation Age of Onset     Heart Disease Father         heart disease, angioplasty with stent     Cancer - colorectal Father      C.A.D. Father      Breast Cancer Mother        Review of Systems   The comprehensive 10 point Review of Systems is negative other than noted in the HPI or here.     Physical Exam   Vital Signs with Ranges     Wt Readings from Last 4 Encounters:   03/29/21 93.9 kg (207 lb)   02/25/21 97.3 kg (214 lb 6.4 oz)   11/02/20 93 kg (205 lb)   10/15/20 96.1 kg (211 lb 12.8 oz)     GENERAL: Healthy, alert and no distress  EYES: Eyes grossly normal to inspection.  No discharge or erythema, or obvious scleral/conjunctival abnormalities.  RESP: No audible wheeze, cough, or visible cyanosis.  No visible retractions or increased work of breathing.    SKIN: Visible skin clear. No significant rash, abnormal pigmentation or lesions.  NEURO: Cranial nerves grossly intact.  Mentation and speech appropriate for age.  PSYCH: Mentation appears normal, affect normal/bright, judgement and insight intact, normal speech and appearance well-groomed.       Thank you for allowing me to participate in the care of your patient.      Sincerely,     Stephane Baker MD     [facility]  cc:   Referred Self

## 2022-07-29 ENCOUNTER — VIRTUAL VISIT (OUTPATIENT)
Dept: ONCOLOGY | Facility: CLINIC | Age: 52
End: 2022-07-29
Attending: INTERNAL MEDICINE
Payer: COMMERCIAL

## 2022-07-29 DIAGNOSIS — E78.5 HYPERLIPIDEMIA LDL GOAL <100: ICD-10-CM

## 2022-07-29 PROCEDURE — 97802 MEDICAL NUTRITION INDIV IN: CPT | Mod: GT,95 | Performed by: DIETITIAN, REGISTERED

## 2022-07-29 NOTE — LETTER
"    7/29/2022         RE: Fercho Phillips  [address]  [facility] [address]        Dear Colleague,    Thank you for referring your patient, Fercho Phillips, to the [facility]. Please see a copy of my visit note below.    CLINICAL NUTRITION SERVICES - ASSESSMENT NOTE    Fercho Phillips 52 year old referred for MNT related to hyperlipidemia    Time Spent: 30 minutes  Visit Type: video  Referring Physician: Katie GARCIA 5/11  E78.5 (ICD-10-CM) - Hyperlipidemia LDL goal <100  Pt accompanied by: his wife, Margarita.    NUTRITION HISTORY  Current diet: general diet    Fercho presents today with desire to learn about food choices for heart health.   He has been trying to limit cheese, chips, red meat, saturated fats etc.  He is also limiting soda.   He dines out twice a week and tries to avoid fried foods.     Diet Recall  Breakfast Oatmeal OR egg sandwich, +/- juice  Granola bar/breakfast bar, banana   Lunch Turner w/ ham/turkey, pretzels, water   Dinner Chicken with salad or cooked vegetable, +/- potatoes/pasta (limiting0   Snacks Chips and salsa, cookies   Beverages A lot of water throughout the day     ANTHROPOMETRICS  Height: 6'1\"  Weight: 205 lbs/93kg  BMI: 26  Weight Status:  Overweight BMI 25-29.9  Weight History:   Wt Readings from Last 10 Encounters:   05/11/22 93.4 kg (205 lb 12.8 oz)   03/29/21 93.9 kg (207 lb)   02/25/21 97.3 kg (214 lb 6.4 oz)   11/02/20 93 kg (205 lb)   10/15/20 96.1 kg (211 lb 12.8 oz)   02/11/20 94.8 kg (209 lb 1.6 oz)   01/16/20 94.5 kg (208 lb 4.8 oz)   04/22/19 93.3 kg (205 lb 9.6 oz)   03/01/19 93.9 kg (207 lb)   01/17/19 95.8 kg (211 lb 3.2 oz)     Dosing Weight: 93kg    Medications/vitamins/minerals/herbals:   Reviewed - Lipitor 40mg (increase dose 4/6/22)    Labs: 3/24/22  Labs reviewed   Chol 151  LDL 92  HDL 43    ASSESSED NUTRITION NEEDS:  Estimated Energy Needs: 2300 kcals (25 Kcal/Kg)  Justification: maintenance  Estimated Protein Needs: 75-90 grams protein " (0.8-1 g pro/Kg)  Justification: maintenance    NUTRITION DIAGNOSIS:  Food and nutrition-related knowledge deficit related to food choices for heart health as evidenced by pt with questions regarding foods to consume and foods to avoid.     INTERVENTIONS  Provided written & verbal education:   Reviewed fiber intake (soluble and insoluble), omega 3 intake, reviewed TLC diet guidelines for lowering cholesterol (reviewed LDL, HDL, TG, total cholesterol, saturated/trans fats, mono/polyunsaturated fats, cholesterol) and exercise.  Encouraged to try limiting total fat to <60g/day, saturated fat to <12g/day and increase fiber to >25g/day.   Reviewed Mediterranean diet guidelines. Encouraged to limit animal proteins and include more plant protein and seafood/fish.     Provided pt with corresponding education materials/handouts on:  TLC heart health guidelines, Mediterranean diet guidelines, Soluble fiber, High fiber diet      Pt verbalize understanding of materials provided during consult.   Patient Understanding: Excellent  Expected Compliance: Excellent    Follow-Up Plans: Pt has RD contact information for questions.        Patito Connolly, CORTNEYN, LDN

## 2022-07-29 NOTE — PROGRESS NOTES
"Video-Visit Details     Type of service:  Video Visit     Video Start Time (time video started): 10:56am     Video End Time (time video stopped): 11:20am    Originating Location (pt. Location): Home     Distant Location (provider location):  [facility] NUTRITION SERVICES      Mode of Communication:  Video Conference via [facility] NUTRITION SERVICES - ASSESSMENT NOTE    Fercho Phillips 52 year old referred for MNT related to hyperlipidemia    Time Spent: 30 minutes  Visit Type: video  Referring Physician: Katie GARCIA 5/11  E78.5 (ICD-10-CM) - Hyperlipidemia LDL goal <100  Pt accompanied by: his wife, Margarita.    NUTRITION HISTORY  Current diet: general diet    Fercho presents today with desire to learn about food choices for heart health.   He has been trying to limit cheese, chips, red meat, saturated fats etc.  He is also limiting soda.   He dines out twice a week and tries to avoid fried foods.     Diet Recall  Breakfast Oatmeal OR egg sandwich, +/- juice  Granola bar/breakfast bar, banana   Lunch Manning w/ ham/turkey, pretzels, water   Dinner Chicken with salad or cooked vegetable, +/- potatoes/pasta (limiting0   Snacks Chips and salsa, cookies   Beverages A lot of water throughout the day     ANTHROPOMETRICS  Height: 6'1\"  Weight: 205 lbs/93kg  BMI: 26  Weight Status:  Overweight BMI 25-29.9  Weight History:   Wt Readings from Last 10 Encounters:   05/11/22 93.4 kg (205 lb 12.8 oz)   03/29/21 93.9 kg (207 lb)   02/25/21 97.3 kg (214 lb 6.4 oz)   11/02/20 93 kg (205 lb)   10/15/20 96.1 kg (211 lb 12.8 oz)   02/11/20 94.8 kg (209 lb 1.6 oz)   01/16/20 94.5 kg (208 lb 4.8 oz)   04/22/19 93.3 kg (205 lb 9.6 oz)   03/01/19 93.9 kg (207 lb)   01/17/19 95.8 kg (211 lb 3.2 oz)     Dosing Weight: 93kg    Medications/vitamins/minerals/herbals:   Reviewed - Lipitor 40mg (increase dose 4/6/22)    Labs: 3/24/22  Labs reviewed   Chol 151  LDL 92  HDL 43    ASSESSED NUTRITION NEEDS:  Estimated Energy " Needs: 2300 kcals (25 Kcal/Kg)  Justification: maintenance  Estimated Protein Needs: 75-90 grams protein (0.8-1 g pro/Kg)  Justification: maintenance    NUTRITION DIAGNOSIS:  Food and nutrition-related knowledge deficit related to food choices for heart health as evidenced by pt with questions regarding foods to consume and foods to avoid.     INTERVENTIONS  Provided written & verbal education:   Reviewed fiber intake (soluble and insoluble), omega 3 intake, reviewed TLC diet guidelines for lowering cholesterol (reviewed LDL, HDL, TG, total cholesterol, saturated/trans fats, mono/polyunsaturated fats, cholesterol) and exercise.  Encouraged to try limiting total fat to <60g/day, saturated fat to <12g/day and increase fiber to >25g/day.   Reviewed Mediterranean diet guidelines. Encouraged to limit animal proteins and include more plant protein and seafood/fish.     Provided pt with corresponding education materials/handouts on:  TLC heart health guidelines, Mediterranean diet guidelines, Soluble fiber, High fiber diet      Pt verbalize understanding of materials provided during consult.   Patient Understanding: Excellent  Expected Compliance: Excellent    Follow-Up Plans: Pt has RD contact information for questions.    Patito Connolly RDN, LDN

## 2022-09-09 ENCOUNTER — LAB (OUTPATIENT)
Dept: LAB | Facility: CLINIC | Age: 52
End: 2022-09-09
Payer: COMMERCIAL

## 2022-09-09 DIAGNOSIS — E78.5 HYPERLIPIDEMIA LDL GOAL <100: ICD-10-CM

## 2022-09-09 LAB
ALT SERPL W P-5'-P-CCNC: 26 U/L (ref 10–50)
CHOLEST SERPL-MCNC: 119 MG/DL
HDLC SERPL-MCNC: 41 MG/DL
LDLC SERPL CALC-MCNC: 62 MG/DL
NONHDLC SERPL-MCNC: 78 MG/DL
TRIGL SERPL-MCNC: 80 MG/DL

## 2022-09-09 PROCEDURE — 36415 COLL VENOUS BLD VENIPUNCTURE: CPT

## 2022-09-09 PROCEDURE — 84460 ALANINE AMINO (ALT) (SGPT): CPT

## 2022-09-09 PROCEDURE — 80061 LIPID PANEL: CPT

## 2022-10-04 DIAGNOSIS — R93.1 ELEVATED CORONARY ARTERY CALCIUM SCORE: ICD-10-CM

## 2022-10-04 DIAGNOSIS — Z82.49 FAMILY HISTORY OF HEART DISEASE: ICD-10-CM

## 2022-10-04 NOTE — TELEPHONE ENCOUNTER
Routing refill request to provider for review/approval because:  Patient needs to be seen because it has been more than 1 year since last office visit.  Nubia Abarca RN

## 2022-10-05 RX ORDER — ATORVASTATIN CALCIUM 40 MG/1
TABLET, FILM COATED ORAL
Qty: 90 TABLET | Refills: 1 | Status: SHIPPED | OUTPATIENT
Start: 2022-10-05 | End: 2023-04-03

## 2022-10-05 NOTE — TELEPHONE ENCOUNTER
Pt calling to get an update on this medication. He will be out after today.    Alyson Nava Patient

## 2022-10-22 ENCOUNTER — HEALTH MAINTENANCE LETTER (OUTPATIENT)
Age: 52
End: 2022-10-22

## 2022-11-15 ENCOUNTER — TELEPHONE (OUTPATIENT)
Dept: FAMILY MEDICINE | Facility: CLINIC | Age: 52
End: 2022-11-15

## 2022-11-15 DIAGNOSIS — F41.0 ANXIETY ATTACK: Primary | ICD-10-CM

## 2022-11-15 DIAGNOSIS — L72.3 SEBACEOUS CYST: ICD-10-CM

## 2022-11-18 RX ORDER — DIAZEPAM 10 MG
10 TABLET ORAL EVERY 6 HOURS PRN
Qty: 1 TABLET | Refills: 0 | Status: SHIPPED | OUTPATIENT
Start: 2022-11-18

## 2022-12-21 ASSESSMENT — ENCOUNTER SYMPTOMS
ABDOMINAL PAIN: 0
FEVER: 0
SORE THROAT: 0
PARESTHESIAS: 0
HEARTBURN: 0
MYALGIAS: 0
CHILLS: 0
DIZZINESS: 0
COUGH: 0
ARTHRALGIAS: 0
SHORTNESS OF BREATH: 0
HEMATURIA: 0
WEAKNESS: 0
DIARRHEA: 0
PALPITATIONS: 0
FREQUENCY: 0
CONSTIPATION: 0
JOINT SWELLING: 0
HEADACHES: 0
EYE PAIN: 0
HEMATOCHEZIA: 0
NERVOUS/ANXIOUS: 0
DYSURIA: 0
NAUSEA: 0

## 2022-12-26 ENCOUNTER — OFFICE VISIT (OUTPATIENT)
Dept: FAMILY MEDICINE | Facility: CLINIC | Age: 52
End: 2022-12-26
Payer: COMMERCIAL

## 2022-12-26 VITALS
TEMPERATURE: 97.7 F | SYSTOLIC BLOOD PRESSURE: 122 MMHG | OXYGEN SATURATION: 98 % | WEIGHT: 216.7 LBS | HEART RATE: 73 BPM | DIASTOLIC BLOOD PRESSURE: 77 MMHG | RESPIRATION RATE: 15 BRPM | BODY MASS INDEX: 28.2 KG/M2

## 2022-12-26 DIAGNOSIS — Z12.11 COLON CANCER SCREENING: ICD-10-CM

## 2022-12-26 DIAGNOSIS — Z12.5 SCREENING FOR PROSTATE CANCER: Primary | ICD-10-CM

## 2022-12-26 DIAGNOSIS — L72.3 SEBACEOUS CYST: ICD-10-CM

## 2022-12-26 LAB — PSA SERPL-MCNC: 0.51 NG/ML (ref 0–3.5)

## 2022-12-26 PROCEDURE — 90471 IMMUNIZATION ADMIN: CPT | Performed by: FAMILY MEDICINE

## 2022-12-26 PROCEDURE — 99396 PREV VISIT EST AGE 40-64: CPT | Mod: 25 | Performed by: FAMILY MEDICINE

## 2022-12-26 PROCEDURE — 11422 EXC H-F-NK-SP B9+MARG 1.1-2: CPT | Performed by: FAMILY MEDICINE

## 2022-12-26 PROCEDURE — 90715 TDAP VACCINE 7 YRS/> IM: CPT | Performed by: FAMILY MEDICINE

## 2022-12-26 PROCEDURE — 36415 COLL VENOUS BLD VENIPUNCTURE: CPT | Performed by: FAMILY MEDICINE

## 2022-12-26 PROCEDURE — G0103 PSA SCREENING: HCPCS | Performed by: FAMILY MEDICINE

## 2022-12-26 ASSESSMENT — ENCOUNTER SYMPTOMS
DIARRHEA: 0
NERVOUS/ANXIOUS: 0
SORE THROAT: 0
HEADACHES: 0
ABDOMINAL PAIN: 0
SHORTNESS OF BREATH: 0
ARTHRALGIAS: 0
COUGH: 0
CONSTIPATION: 0
DYSURIA: 0
JOINT SWELLING: 0
HEARTBURN: 0
EYE PAIN: 0
PALPITATIONS: 0
FREQUENCY: 0
WEAKNESS: 0
HEMATOCHEZIA: 0
DIZZINESS: 0
PARESTHESIAS: 0
FEVER: 0
MYALGIAS: 0
HEMATURIA: 0
NAUSEA: 0
CHILLS: 0

## 2022-12-26 NOTE — PROGRESS NOTES
SUBJECTIVE:   CC: Fercho is an 52 year old who presents for preventative health visit.     Patient has been advised of split billing requirements and indicates understanding: Yes  Healthy Habits:     Getting at least 3 servings of Calcium per day:  Yes    Bi-annual eye exam:  Yes    Dental care twice a year:  Yes    Sleep apnea or symptoms of sleep apnea:  None    Diet:  Low fat/cholesterol    Frequency of exercise:  2-3 days/week    Duration of exercise:  30-45 minutes    Taking medications regularly:  Yes    Medication side effects:  None    PHQ-2 Total Score: 0    Additional concerns today:  No    Ability to successfully perform activities of daily living: Yes, no assistance needed  Home safety:  none identified   Hearing impairment: Yes,       Today's PHQ-2 Score:   PHQ-2 ( 1999 Pf[employer]) 12/21/2022   Q1: Little interest or pleasure in doing things 0   Q2: Feeling down, depressed or hopeless 0   PHQ-2 Score 0   PHQ-2 Total Score (12-17 Years)- Positive if 3 or more points; Administer PHQ-A if positive -   Q1: Little interest or pleasure in doing things Not at all   Q2: Feeling down, depressed or hopeless Not at all   PHQ-2 Score 0       Have you ever done Advance Care Planning? (For example, a Health Directive, POLST, or a discussion with a medical provider or your loved ones about your wishes): No, advance care planning information given to patient to review.  Patient plans to discuss their wishes with loved ones or provider.      Social History     Tobacco Use     Smoking status: Never     Smokeless tobacco: Never   Substance Use Topics     Alcohol use: Not Currently     If you drink alcohol do you typically have >3 drinks per day or >7 drinks per week? No    No flowsheet data found.    Last PSA:   PSA   Date Value Ref Range Status   10/15/2020 0.60 0 - 4 ug/L Final     Comment:     Assay Method:  Chemiluminescence using Siemens Vista analyzer     Prostate Specific Antigen Screen   Date Value Ref Range Status    03/24/2022 0.64 0.00 - 4.00 ug/L Final     Reviewed orders with patient. Reviewed health maintenance and updated orders accordingly - Yes  Lab work is in process  Labs reviewed in EPIC  BP Readings from Last 3 Encounters:   12/26/22 122/77   05/11/22 (!) 140/80   03/29/21 112/78    Wt Readings from Last 3 Encounters:   12/26/22 98.3 kg (216 lb 11.2 oz)   05/11/22 93.4 kg (205 lb 12.8 oz)   03/29/21 93.9 kg (207 lb)           Patient Active Problem List   Diagnosis     HYPERLIPIDEMIA LDL GOAL <130     Achilles tendon tear     Abnormality of gait     Elevated LFTs     Health Care Home     Past Surgical History:   Procedure Laterality Date     COLONOSCOPY N/A 1/11/2016    Procedure: COLONOSCOPY;  Surgeon: Kumar Nunez MD;  Location: [facility]     COLONOSCOPY N/A 3/29/2021    Procedure: COLONOSCOPY;  Surgeon: Kumar Nunez MD;  Location: [facility]     Left shoulder scope      Left shoulder scope     ORTHOPEDIC SURGERY       Right elbow repair      Right elbow repair     Right thumb repair      Right thumb repair       Social History     Tobacco Use     Smoking status: Never     Smokeless tobacco: Never   Substance Use Topics     Alcohol use: Not Currently     Family History   Problem Relation Age of Onset     Heart Disease Father         heart disease, angioplasty with stent     Cancer - colorectal Father      C.A.D. Father      Breast Cancer Mother          Current Outpatient Medications   Medication Sig Dispense Refill     APPLE CIDER VINEGAR PO Take 480 g by mouth       ASPIRIN 81 MG OR TABS 1 TABLET DAILY       atorvastatin (LIPITOR) 40 MG tablet TAKE 1 TABLET(40 MG) BY MOUTH DAILY 90 tablet 1     Capsicum, Cayenne, (CAYENNE PO) Take 500 mg by mouth       Cholecalciferol (VITAMIN D3 PO) Take 1 tablet by mouth daily.       cinnamon 500 MG TABS Take 1 tablet by mouth daily.       Coenzyme Q10 (CO Q 10) 10 MG CAPS        Flaxseed, Linseed, (FLAX SEED OIL) 1000 MG capsule Take 1 capsule by mouth daily.       ibuprofen  (ADVIL,MOTRIN) 200 MG tablet Take 2 tablets by mouth daily as needed.       Krill Oil 1000 MG CAPS        Multiple Vitamin (MULTI-VITAMIN) per tablet Take 1 tablet by mouth daily.       NIACIN PO Take 1 tablet by mouth daily       NONFORMULARY 2,475 mg Endocalyx       Omega-3 Fatty Acids (FISH OIL PO) Take 1 capsule by mouth daily.       Turmeric 500 MG CAPS        diazepam (VALIUM) 10 MG tablet Take 1 tablet (10 mg) by mouth every 6 hours as needed for anxiety or sleep Take 30-60 minutes before procedure.  Do not operate a vehicle after taking this medication. 1 tablet 0     No Known Allergies    Reviewed and updated as needed this visit by clinical staff   Tobacco  Allergies  Meds              Reviewed and updated as needed this visit by Provider                Review of Systems   Constitutional: Negative for chills and fever.   HENT: Negative for congestion, ear pain, hearing loss and sore throat.    Eyes: Negative for pain and visual disturbance.   Respiratory: Negative for cough and shortness of breath.    Cardiovascular: Negative for chest pain, palpitations and peripheral edema.   Gastrointestinal: Negative for abdominal pain, constipation, diarrhea, heartburn, hematochezia and nausea.   Genitourinary: Negative for dysuria, frequency, genital sores, hematuria, impotence, penile discharge and urgency.   Musculoskeletal: Negative for arthralgias, joint swelling and myalgias.   Skin: Negative for rash.   Neurological: Negative for dizziness, weakness, headaches and paresthesias.   Psychiatric/Behavioral: Negative for mood changes. The patient is not nervous/anxious.      CONSTITUTIONAL: NEGATIVE for fever, chills, change in weight  INTEGUMENTARY/SKIN: NEGATIVE for worrisome rashes, moles or lesions  EYES: NEGATIVE for vision changes or irritation  ENT: NEGATIVE for ear, mouth and throat problems  RESP: NEGATIVE for significant cough or SOB  CV: NEGATIVE for chest pain, palpitations or peripheral edema  GI:  NEGATIVE for nausea, abdominal pain, heartburn, or change in bowel habits   male: negative for dysuria, hematuria, decreased urinary stream, erectile dysfunction, urethral discharge  MUSCULOSKELETAL: NEGATIVE for significant arthralgias or myalgia  NEURO: NEGATIVE for weakness, dizziness or paresthesias  PSYCHIATRIC: NEGATIVE for changes in mood or affect    OBJECTIVE:   /77   Pulse 73   Temp 97.7  F (36.5  C) (Tympanic)   Resp 15   Wt 98.3 kg (216 lb 11.2 oz)   SpO2 98%   BMI 28.20 kg/m      Physical Exam  GENERAL: healthy, alert and no distress  NECK: no adenopathy, no asymmetry, masses, or scars and thyroid normal to palpation  RESP: lungs clear to auscultation - no rales, rhonchi or wheezes  CV: regular rate and rhythm, normal S1 S2, no S3 or S4, no murmur, click or rub, no peripheral edema and peripheral pulses strong  ABDOMEN: soft, nontender, no hepatosplenomegaly, no masses and bowel sounds normal  MS: no gross musculoskeletal defects noted, no edema  Head 1.3 cm cystic lesion well circuscribed. Left scalpe.   Diagnostic Test Results:  Labs reviewed in Epic    ASSESSMENT/PLAN:   (Z12.5) Screening for prostate cancer  (primary encounter diagnosis)  Plan: PSA, screen         (L72.3) Sebaceous cyst  Comment:    After discussing Risks, Benefits and alternative treatments, answering any questons to .name satisfaction. patient requested procedure peformed and signed consent.     Area was sterily pred, drapped.  Using sterile techniqye lesion was removed withot any complication.  Anesthesia was occomplished with 1% lidocaine with epi..  Pt tolerated procedure well.    0. 3cc's estimated blood loss.  Sterile dressing applied.  Hemastasis was obtained with 4-0 ethilon sutures interrrupted    Cyst was opened and had waxy material inside typical of sebaceous cyst.   Plan: Lesion Excision 1.1 cm to 2.0 cm            Patient has been advised of split billing requirements and indicates understanding:  Yes      COUNSELING:   Reviewed preventive health counseling, as reflected in patient instructions       Regular exercise       Healthy diet/nutrition       Vision screening       Hearing screening       Alcohol Use        Colorectal cancer screening       Prostate cancer screening        He reports that he has never smoked. He has never used smokeless tobacco.        Ranulfo Najera MD  [facility]

## 2022-12-27 ENCOUNTER — TELEPHONE (OUTPATIENT)
Dept: FAMILY MEDICINE | Facility: CLINIC | Age: 52
End: 2022-12-27

## 2022-12-27 NOTE — TELEPHONE ENCOUNTER
Patient wondering when on Wednesday to come get sutures removed from head. Please advise.    Thank you,    Lakesha Churchill, CHRISTINE Love

## 2023-01-04 ENCOUNTER — OFFICE VISIT (OUTPATIENT)
Dept: FAMILY MEDICINE | Facility: CLINIC | Age: 53
End: 2023-01-04
Payer: COMMERCIAL

## 2023-01-04 VITALS
DIASTOLIC BLOOD PRESSURE: 84 MMHG | RESPIRATION RATE: 14 BRPM | SYSTOLIC BLOOD PRESSURE: 126 MMHG | HEIGHT: 73 IN | HEART RATE: 61 BPM | OXYGEN SATURATION: 98 % | BODY MASS INDEX: 28.69 KG/M2 | WEIGHT: 216.5 LBS | TEMPERATURE: 96.9 F

## 2023-01-04 DIAGNOSIS — L72.3 SEBACEOUS CYST: Primary | ICD-10-CM

## 2023-01-04 PROCEDURE — 90677 PCV20 VACCINE IM: CPT | Performed by: FAMILY MEDICINE

## 2023-01-04 PROCEDURE — 90471 IMMUNIZATION ADMIN: CPT | Performed by: FAMILY MEDICINE

## 2023-01-04 PROCEDURE — 99207 PR NO CHARGE LOS: CPT | Mod: 25 | Performed by: FAMILY MEDICINE

## 2023-01-04 ASSESSMENT — PAIN SCALES - GENERAL: PAINLEVEL: NO PAIN (0)

## 2023-01-04 NOTE — PROGRESS NOTES
SUBJECTIVE:                                                    Fercho Phillips is a 52 year old male who presents to clinic today for the following health issues:    Chief Complaint   Patient presents with     Suture Removal     -Had sutures on his head placed by you on 12/26/2022 and is needing to get them removed.    Answers for HPI/ROS submitted by the patient on 1/4/2023  What is the reason for your visit today? : stiches removal  How many servings of fruits and vegetables do you eat daily?: 2-3  On average, how many sweetened beverages do you drink each day (Examples: soda, juice, sweet tea, etc.  Do NOT count diet or artificially sweetened beverages)?: 1  How many minutes a day do you exercise enough to make your heart beat faster?: 30 to 60  How many days per week do you miss taking your medication?: 0      Problem list and histories reviewed & adjusted, as indicated.  Additional history:     Patient Active Problem List   Diagnosis     HYPERLIPIDEMIA LDL GOAL <130     Achilles tendon tear     Abnormality of gait     Elevated LFTs     Health Care Home     Past Surgical History:   Procedure Laterality Date     COLONOSCOPY N/A 1/11/2016    Procedure: COLONOSCOPY;  Surgeon: Kumar Nunez MD;  Location: [facility]     COLONOSCOPY N/A 3/29/2021    Procedure: COLONOSCOPY;  Surgeon: Kumar Nunez MD;  Location: [facility]     Left shoulder scope      Left shoulder scope     ORTHOPEDIC SURGERY       Right elbow repair      Right elbow repair     Right thumb repair      Right thumb repair       Social History     Tobacco Use     Smoking status: Never     Smokeless tobacco: Never   Substance Use Topics     Alcohol use: Not Currently     Family History   Problem Relation Age of Onset     Heart Disease Father         heart disease, angioplasty with stent     Cancer - colorectal Father      C.A.D. Father      Breast Cancer Mother          Current Outpatient Medications   Medication Sig Dispense Refill     APPLE CIDER VINEGAR PO Take  "480 g by mouth       ASPIRIN 81 MG OR TABS 1 TABLET DAILY       atorvastatin (LIPITOR) 40 MG tablet TAKE 1 TABLET(40 MG) BY MOUTH DAILY 90 tablet 1     Capsicum, Cayenne, (CAYENNE PO) Take 500 mg by mouth       Cholecalciferol (VITAMIN D3 PO) Take 1 tablet by mouth daily.       cinnamon 500 MG TABS Take 1 tablet by mouth daily.       Coenzyme Q10 (CO Q 10) 10 MG CAPS        diazepam (VALIUM) 10 MG tablet Take 1 tablet (10 mg) by mouth every 6 hours as needed for anxiety or sleep Take 30-60 minutes before procedure.  Do not operate a vehicle after taking this medication. 1 tablet 0     Flaxseed, Linseed, (FLAX SEED OIL) 1000 MG capsule Take 1 capsule by mouth daily.       ibuprofen (ADVIL,MOTRIN) 200 MG tablet Take 2 tablets by mouth daily as needed.       Krill Oil 1000 MG CAPS        Multiple Vitamin (MULTI-VITAMIN) per tablet Take 1 tablet by mouth daily.       NIACIN PO Take 1 tablet by mouth daily       NONFORMULARY 2,475 mg Endocalyx       Omega-3 Fatty Acids (FISH OIL PO) Take 1 capsule by mouth daily.       Turmeric 500 MG CAPS        No Known Allergies    ROS:  Constitutional, HEENT, cardiovascular, pulmonary, gi and gu systems are negative, except as otherwise noted.    OBJECTIVE:                                                    /84   Pulse 61   Temp 96.9  F (36.1  C) (Tympanic)   Resp 14   Ht 1.854 m (6' 1\")   Wt 98.2 kg (216 lb 8 oz)   SpO2 98%   BMI 28.56 kg/m   Body mass index is 28.56 kg/m .     Incision is well healed and dry.    Suture removed witho complication.       ASSESSMENT/PLAN:                                                      (L72.3) Sebaceous cyst  (primary encounter diagnosis)  Sutures removed without complication     reports that he has never smoked. He has never used smokeless tobacco.      Weight management plan: Discussed healthy diet and exercise guidelines      [facility]    "

## 2023-01-13 ENCOUNTER — TELEPHONE (OUTPATIENT)
Dept: SURGERY | Facility: CLINIC | Age: 53
End: 2023-01-13

## 2023-01-13 ENCOUNTER — HOSPITAL ENCOUNTER (OUTPATIENT)
Facility: CLINIC | Age: 53
End: 2023-01-13
Attending: SURGERY | Admitting: SURGERY
Payer: COMMERCIAL

## 2023-01-13 DIAGNOSIS — Z12.11 SPECIAL SCREENING FOR MALIGNANT NEOPLASMS, COLON: Primary | ICD-10-CM

## 2023-01-13 NOTE — TELEPHONE ENCOUNTER
"    Screening Questions  BLUE  KIND OF PREP RED  LOCATION [review exclusion criteria] GREEN  SEDATION TYPE        Y Are you active on mychart?       [facility] Ordering/Referring Provider?        [employer] What type of coverage do you have?      N Have you had a positive covid test in the last 14 days?     28.56 1. BMI  [BMI 40+ - review exclusion criteria]    Y  2. Are you able to give consent for your medical care? [IF NO,RN REVIEW]          N  3. Are you taking any prescription pain medications on a routine schedule   (ex narcotics: tramadol, oxycodone, roxicodone, oxycontin,  and percocet)?        N  3a. EXTENDED PREP What kind of prescription?     N 4. Do you have any chemical dependencies such as alcohol, street drugs, or methadone?        **If yes 3- 5 , please schedule with MAC sedation.**          IF YES TO ANY 6 - 10 - HOSPITAL SETTING ONLY.     n 6.   Do you need assistance transferring?     n 7.   Have you had a heart or lung transplant?    N 8.   Are you currently on dialysis?   n 9.   Do you use daily home oxygen?   N 10. Do you take nitroglycerin?   10a. n If yes, how often?     11. [FEMALES]  N Are you currently pregnant?    11a. N If yes, how many weeks? [ Greater than 12 weeks, OR NEEDED]    N 12. Do you have Pulmonary Hypertension? *NEED PAC APPT AT [facility]*     Nn 13. [review exclusion criteria]  Do you have any implantable devices in your body (pacemaker, defib, LVAD)?    N 14. In the past 6 months, have you had any heart related issues including cardiomyopathy or heart attack?     14a. N If yes, did it require cardiac stenting if so when?     N 15. Have you had a stroke or Transient ischemic attack (TIA - aka  mini stroke ) within 6 months?      N 16. Do you have mod to severe Obstructive Sleep Apnea?  [Hospital only]    N 17. Do you have SEVERE AND UNCONTROLLED asthma? *NEED PAC APPT AT [facility]*     N 18. Are you currently taking any blood thinners?     18a. If yes, inform patient to \"follow up w/ " "ordering provider for bridging instructions.\"    N 19. Do you take the medication Phentermine?    19a. If yes, \"Hold for 7 days before procedure.  Please consult your prescribing provider if you have questions about holding this medication.\"     N  20. Do you have chronic kidney disease?      N  21. Do you have a diagnosis of diabetes?     N  22. On a regular basis do you go 3-5 days between bowel movements?     See below 23. Preferred LOCAL Pharmacy for Pre Prescription    [ LIST ONLY ONE PHARMACY]     [id number] [place] [address] AT [place] [address] [address]        - CLOSING REMINDERS -    Informed patient they will need an adult    Cannot take any type of public or medical transportation alone    Conscious Sedation- Needs  for 6 hours after the procedure       MAC/General-Needs  for 24 hours after procedure    Pre-Procedure Covid test to be completed [[facility] PCR Testing Required]    Confirmed Nurse will call to complete assessment       - SCHEDULING DETAILS -  N Hospital Setting Required? If yes, what is the exclusion?: N   [place]  Surgeon    5-1-23  Date of Procedure  Lower Endoscopy [Colonoscopy]  Type of Procedure Scheduled  [facility]-If you answer yes to questions #8, #20, #21Which Colonoscopy Prep was Sent?     GEN Sedation Type     N PAC / Pre-op Required                 "

## 2023-04-01 DIAGNOSIS — R93.1 ELEVATED CORONARY ARTERY CALCIUM SCORE: ICD-10-CM

## 2023-04-01 DIAGNOSIS — Z82.49 FAMILY HISTORY OF HEART DISEASE: ICD-10-CM

## 2023-04-03 RX ORDER — ATORVASTATIN CALCIUM 40 MG/1
TABLET, FILM COATED ORAL
Qty: 90 TABLET | Refills: 1 | Status: SHIPPED | OUTPATIENT
Start: 2023-04-03 | End: 2023-10-02

## 2023-04-03 NOTE — TELEPHONE ENCOUNTER
Routing refill request to provider for review/approval because:  Patient needs an annual visit.     Elizabeth Epperson RN on 4/3/2023 at 10:57 AM

## 2023-04-24 RX ORDER — BISACODYL 5 MG/1
TABLET, DELAYED RELEASE ORAL
Qty: 4 TABLET | Refills: 0 | Status: SHIPPED | OUTPATIENT
Start: 2023-04-24 | End: 2023-04-25 | Stop reason: HOSPADM

## 2023-04-25 ENCOUNTER — TELEPHONE (OUTPATIENT)
Dept: FAMILY MEDICINE | Facility: CLINIC | Age: 53
End: 2023-04-25
Payer: COMMERCIAL

## 2023-04-25 NOTE — TELEPHONE ENCOUNTER
Pt states scheduled for screening colonoscopy 05-01-23, states recommended by PCP.  Last screening colonoscopy 03-20-21- WNL.  Denies any abnormal sx warranting diagnostic scope.  Before cancelling colonoscopy, pt requesting Dr. Najera to review.  CHELLE Chan RN

## 2023-04-25 NOTE — TELEPHONE ENCOUNTER
Patient calling again to inquire about his scheduled colonoscopy. Last colonoscopy 03/29/21 with Dr Nunez:  Colonoscopy - normal colon. No masses, polyps or diverticula.  Per health maintenance, he is not due until 03/29/26. He is not having any abnormal symptoms.   Ok to cancel for 05/23?  Patient notified Dr Najera not in clinic today. Care team to notify patient once Dr Najera reviews.  Korina LINDA RN

## 2023-09-13 ENCOUNTER — LAB (OUTPATIENT)
Dept: LAB | Facility: CLINIC | Age: 53
End: 2023-09-13
Payer: COMMERCIAL

## 2023-09-13 DIAGNOSIS — E78.5 HYPERLIPIDEMIA LDL GOAL <100: ICD-10-CM

## 2023-09-13 DIAGNOSIS — E78.5 HYPERLIPIDEMIA LDL GOAL <130: Primary | ICD-10-CM

## 2023-09-13 LAB
ALT SERPL W P-5'-P-CCNC: 34 U/L (ref 0–70)
CHOLEST SERPL-MCNC: 130 MG/DL
HDLC SERPL-MCNC: 42 MG/DL
LDLC SERPL CALC-MCNC: 68 MG/DL
NONHDLC SERPL-MCNC: 88 MG/DL
TRIGL SERPL-MCNC: 98 MG/DL

## 2023-09-13 PROCEDURE — 80061 LIPID PANEL: CPT

## 2023-09-13 PROCEDURE — 36415 COLL VENOUS BLD VENIPUNCTURE: CPT

## 2023-09-13 PROCEDURE — 84460 ALANINE AMINO (ALT) (SGPT): CPT

## 2023-10-01 DIAGNOSIS — Z82.49 FAMILY HISTORY OF HEART DISEASE: ICD-10-CM

## 2023-10-01 DIAGNOSIS — R93.1 ELEVATED CORONARY ARTERY CALCIUM SCORE: ICD-10-CM

## 2023-10-02 RX ORDER — ATORVASTATIN CALCIUM 40 MG/1
TABLET, FILM COATED ORAL
Qty: 90 TABLET | Refills: 0 | Status: SHIPPED | OUTPATIENT
Start: 2023-10-02 | End: 2023-12-29

## 2023-10-02 NOTE — TELEPHONE ENCOUNTER
"Prescription approved per [facility] Refill Protocol.       Requested Prescriptions   Pending Prescriptions Disp Refills    atorvastatin (LIPITOR) 40 MG tablet [Pharmacy Med Name: ATORVASTATIN 40MG TABLETS] 90 tablet 0     Sig: TAKE 1 TABLET(40 MG) BY MOUTH DAILY       Statins Protocol Passed - 10/1/2023  4:33 PM        Passed - LDL on file in past 12 months     Recent Labs   Lab Test 09/13/23  0755   LDL 68             Passed - No abnormal creatine kinase in past 12 months     No lab results found.             Passed - Recent (12 mo) or future (30 days) visit within the authorizing provider's specialty     Patient has had an office visit with the authorizing provider or a provider within the authorizing providers department within the previous 12 mos or has a future within next 30 days. See \"Patient Info\" tab in inbasket, or \"Choose Columns\" in Meds & Orders section of the refill encounter.              Passed - Medication is active on med list        Passed - Patient is age 18 or older                 Elizabeth Epperson RN 10/02/23 2:25 PM   "

## 2023-11-27 ENCOUNTER — PATIENT OUTREACH (OUTPATIENT)
Dept: CARE COORDINATION | Facility: CLINIC | Age: 53
End: 2023-11-27
Payer: COMMERCIAL

## 2023-12-11 ENCOUNTER — PATIENT OUTREACH (OUTPATIENT)
Dept: CARE COORDINATION | Facility: CLINIC | Age: 53
End: 2023-12-11
Payer: COMMERCIAL

## 2023-12-29 DIAGNOSIS — Z82.49 FAMILY HISTORY OF HEART DISEASE: ICD-10-CM

## 2023-12-29 DIAGNOSIS — R93.1 ELEVATED CORONARY ARTERY CALCIUM SCORE: ICD-10-CM

## 2023-12-29 RX ORDER — ATORVASTATIN CALCIUM 40 MG/1
TABLET, FILM COATED ORAL
Qty: 90 TABLET | Refills: 1 | Status: SHIPPED | OUTPATIENT
Start: 2023-12-29 | End: 2024-07-15

## 2024-01-08 RX ORDER — ATORVASTATIN CALCIUM 20 MG/1
TABLET, FILM COATED ORAL
Qty: 90 TABLET | Refills: 0 | OUTPATIENT
Start: 2024-01-08

## 2024-01-10 RX ORDER — ATORVASTATIN CALCIUM 20 MG/1
TABLET, FILM COATED ORAL
Qty: 90 TABLET | Refills: 0 | OUTPATIENT
Start: 2024-01-10

## 2024-01-29 ENCOUNTER — OFFICE VISIT (OUTPATIENT)
Dept: CARDIOLOGY | Facility: CLINIC | Age: 54
End: 2024-01-29
Payer: COMMERCIAL

## 2024-01-29 VITALS
BODY MASS INDEX: 29.65 KG/M2 | RESPIRATION RATE: 15 BRPM | HEART RATE: 68 BPM | OXYGEN SATURATION: 99 % | WEIGHT: 211.8 LBS | SYSTOLIC BLOOD PRESSURE: 131 MMHG | HEIGHT: 71 IN | DIASTOLIC BLOOD PRESSURE: 83 MMHG

## 2024-01-29 DIAGNOSIS — E78.5 HYPERLIPIDEMIA LDL GOAL <100: Primary | ICD-10-CM

## 2024-01-29 PROCEDURE — 99214 OFFICE O/P EST MOD 30 MIN: CPT | Performed by: INTERNAL MEDICINE

## 2024-01-29 NOTE — LETTER
1/29/2024    Ranulfo Najera MD  [address]  [place] [address]    RE: Fercho Phillips       Dear Colleague,     I had the pleasure of seeing Fercho Phillips in the [facility].        Cardiology Clinic    Assessment & Plan     1.  Elevated calcium score 186  2.  Strong family history of early premature coronary artery disease  3.  Orthopedic injuries  4.  Normal EKG  5.  Negative Stress echocardiogram for ischemia  6.  Hypertensive BP response to exercise (15 METS)    Recommendations    1.  Went over prior cardiac testing (echo)  2.  Patient has done well since our last visit in 2022.  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  He is looking forward to that experience.  3.  His LDL is improved to 68 on increasing his Lipitor.  LFTs are within normal limits.  4.  He has always been normotensive on clinic office visits, on no medical therapy  5.  Return to clinic in 1 years time with pre-clinic echocardiogram.        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  He presented in 2019 for a primary preventive visit.  He states that his father had a PCI performed at the age of 36.  He is also had a bypass in his 60s.  He has a twin brother who also has hyperlipidemia which runs in the family.  He does not have any specific cardiac complaints however due to early premature coronary artery disease in his family he underwent a calcium score at an outside facility.  This demonstrated a calcium score of 171.  As a result he presented to establish local cardiac care.  Cholesterol was checked and his LDL was elevated.  He has been on simvastatin for many years however it was advised that he should switch over to Lipitor due to not meeting target goals.  Patient had an EKG performed demonstrating normal sinus rhythm.  He does not have any other additional medical issues with the exception of  orthopedic injuries in the past.  Lifelong non-smoker        Echo 2022  1. The left ventricle is normal in structure, function and size. The visual  ejection fraction is estimated at 60%.  2. The right ventricle is normal in structure, function and size.  3. No valve disease.     No previous echo for comparison.      Stress echo 2019  Target Heart Rate was achieved.  The EKG portion of this stress test was negative for inducible ischemia (see  echo results below).  There was a hypertensive BP response to exercise.  Normal left ventricular function and wall motion at rest and post-stress.        Stephane Baker MD, MD    Primary Care Physician  Ranulfo Najera      Patient Active Problem List   Diagnosis    HYPERLIPIDEMIA LDL GOAL <130    Achilles tendon tear    Abnormality of gait    Elevated LFTs    Health Care Home       Past Medical History  I have reviewed this patient's medical history and updated it with pertinent information if needed.   No past medical history on file.    Past Surgical History  I have reviewed this patient's surgical history and updated it with pertinent information if needed.  Past Surgical History:   Procedure Laterality Date    COLONOSCOPY N/A 1/11/2016    Procedure: COLONOSCOPY;  Surgeon: Kumar Nunez MD;  Location: [facility]    COLONOSCOPY N/A 3/29/2021    Procedure: COLONOSCOPY;  Surgeon: Kumar Nunez MD;  Location: [facility]    Left shoulder scope      Left shoulder scope    ORTHOPEDIC SURGERY      Right elbow repair      Right elbow repair    Right thumb repair      Right thumb repair       Prior to Admission Medications  Cannot display prior to admission medications because the patient has not been admitted in this contact.     [unfilled]  [unfilled]  Allergies  No Known Allergies    Social History   reports that he has never smoked. He has never used smokeless tobacco. He reports that he does not currently use alcohol. He reports that he does not use drugs.    Family  History  Family History   Problem Relation Age of Onset    Heart Disease Father         heart disease, angioplasty with stent    Cancer - colorectal Father     C.A.D. Father     Breast Cancer Mother        Review of Systems  The comprehensive 10 point Review of Systems is negative other than noted in the HPI or here.     Physical Exam  Vital Signs with Ranges     Wt Readings from Last 4 Encounters:   01/04/23 98.2 kg (216 lb 8 oz)   12/26/22 98.3 kg (216 lb 11.2 oz)   05/11/22 93.4 kg (205 lb 12.8 oz)   03/29/21 93.9 kg (207 lb)     GENERAL: Healthy, alert and no distress  EYES: Eyes grossly normal to inspection.  No discharge or erythema, or obvious scleral/conjunctival abnormalities.  RESP: No audible wheeze, cough, or visible cyanosis.  No visible retractions or increased work of breathing.    SKIN: Visible skin clear. No significant rash, abnormal pigmentation or lesions.  NEURO: Cranial nerves grossly intact.  Mentation and speech appropriate for age.  PSYCH: Mentation appears normal, affect normal/bright, judgement and insight intact, normal speech and appearance well-groomed.           Thank you for allowing me to participate in the care of your patient.      Sincerely,     Stephane Baker MD     [facility]  cc:   No referring provider defined for this encounter.

## 2024-01-29 NOTE — PROGRESS NOTES
Cardiology Clinic    Assessment & Plan      1.  Elevated calcium score 186  2.  Strong family history of early premature coronary artery disease  3.  Orthopedic injuries  4.  Normal EKG  5.  Negative Stress echocardiogram for ischemia  6.  Hypertensive BP response to exercise (15 METS)    Recommendations    1.  Went over prior cardiac testing (echo)  2.  Patient has done well since our last visit in 2022.  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  He is looking forward to that experience.  3.  His LDL is improved to 68 on increasing his Lipitor.  LFTs are within normal limits.  4.  He has always been normotensive on clinic office visits, on no medical therapy  5.  Return to clinic in 1 years time with pre-clinic echocardiogram.        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  He presented in 2019 for a primary preventive visit.  He states that his father had a PCI performed at the age of 36.  He is also had a bypass in his 60s.  He has a twin brother who also has hyperlipidemia which runs in the family.  He does not have any specific cardiac complaints however due to early premature coronary artery disease in his family he underwent a calcium score at an outside facility.  This demonstrated a calcium score of 171.  As a result he presented to establish local cardiac care.  Cholesterol was checked and his LDL was elevated.  He has been on simvastatin for many years however it was advised that he should switch over to Lipitor due to not meeting target goals.  Patient had an EKG performed demonstrating normal sinus rhythm.  He does not have any other additional medical issues with the exception of orthopedic injuries in the past.  Lifelong non-smoker        Echo 2022  1. The left ventricle is normal in structure, function and size. The visual  ejection fraction is estimated at 60%.  2. The right  ventricle is normal in structure, function and size.  3. No valve disease.     No previous echo for comparison.      Stress echo 2019  Target Heart Rate was achieved.  The EKG portion of this stress test was negative for inducible ischemia (see  echo results below).  There was a hypertensive BP response to exercise.  Normal left ventricular function and wall motion at rest and post-stress.        Stephane Baker MD, MD    Primary Care Physician   Ranulfo Najera      Patient Active Problem List   Diagnosis    HYPERLIPIDEMIA LDL GOAL <130    Achilles tendon tear    Abnormality of gait    Elevated LFTs    Health Care Home       Past Medical History   I have reviewed this patient's medical history and updated it with pertinent information if needed.   No past medical history on file.    Past Surgical History   I have reviewed this patient's surgical history and updated it with pertinent information if needed.  Past Surgical History:   Procedure Laterality Date    COLONOSCOPY N/A 1/11/2016    Procedure: COLONOSCOPY;  Surgeon: Kumar Nunez MD;  Location: [facility]    COLONOSCOPY N/A 3/29/2021    Procedure: COLONOSCOPY;  Surgeon: Kumar Nunez MD;  Location: [facility]    Left shoulder scope      Left shoulder scope    ORTHOPEDIC SURGERY      Right elbow repair      Right elbow repair    Right thumb repair      Right thumb repair       Prior to Admission Medications   Cannot display prior to admission medications because the patient has not been admitted in this contact.     [unfilled]  [unfilled]  Allergies   No Known Allergies    Social History    reports that he has never smoked. He has never used smokeless tobacco. He reports that he does not currently use alcohol. He reports that he does not use drugs.    Family History   Family History   Problem Relation Age of Onset    Heart Disease Father         heart disease, angioplasty with stent    Cancer - colorectal Father     C.A.D. Father     Breast Cancer Mother         Review of Systems   The comprehensive 10 point Review of Systems is negative other than noted in the HPI or here.     Physical Exam   Vital Signs with Ranges     Wt Readings from Last 4 Encounters:   01/04/23 98.2 kg (216 lb 8 oz)   12/26/22 98.3 kg (216 lb 11.2 oz)   05/11/22 93.4 kg (205 lb 12.8 oz)   03/29/21 93.9 kg (207 lb)     GENERAL: Healthy, alert and no distress  EYES: Eyes grossly normal to inspection.  No discharge or erythema, or obvious scleral/conjunctival abnormalities.  RESP: No audible wheeze, cough, or visible cyanosis.  No visible retractions or increased work of breathing.    SKIN: Visible skin clear. No significant rash, abnormal pigmentation or lesions.  NEURO: Cranial nerves grossly intact.  Mentation and speech appropriate for age.  PSYCH: Mentation appears normal, affect normal/bright, judgement and insight intact, normal speech and appearance well-groomed.

## 2024-03-24 ENCOUNTER — HEALTH MAINTENANCE LETTER (OUTPATIENT)
Age: 54
End: 2024-03-24

## 2024-06-23 ENCOUNTER — PATIENT OUTREACH (OUTPATIENT)
Dept: CARE COORDINATION | Facility: CLINIC | Age: 54
End: 2024-06-23
Payer: COMMERCIAL

## 2024-07-12 DIAGNOSIS — R93.1 ELEVATED CORONARY ARTERY CALCIUM SCORE: ICD-10-CM

## 2024-07-12 DIAGNOSIS — Z82.49 FAMILY HISTORY OF HEART DISEASE: ICD-10-CM

## 2024-07-15 RX ORDER — ATORVASTATIN CALCIUM 40 MG/1
TABLET, FILM COATED ORAL
Qty: 90 TABLET | Refills: 1 | Status: SHIPPED | OUTPATIENT
Start: 2024-07-15

## 2024-10-01 ENCOUNTER — TELEPHONE (OUTPATIENT)
Dept: FAMILY MEDICINE | Facility: CLINIC | Age: 54
End: 2024-10-01
Payer: COMMERCIAL

## 2024-10-01 DIAGNOSIS — E78.5 HYPERLIPIDEMIA LDL GOAL <130: Primary | ICD-10-CM

## 2024-10-01 DIAGNOSIS — Z12.5 SCREENING FOR PROSTATE CANCER: ICD-10-CM

## 2024-10-01 NOTE — TELEPHONE ENCOUNTER
General Call      Reason for Call:  order    What are your questions or concerns:  Patient would like lab orders placed for alt and lipid panel and  a call when this is done so he can schedule.    Date of last appointment with provider: n/a    Could we send this information to you in [employer] or would you prefer to receive a phone call?:   No preference   Okay to leave a detailed message?: Yes at Cell number on file:    Telephone Information:   Mobile [phone]

## 2024-10-09 ENCOUNTER — LAB (OUTPATIENT)
Dept: LAB | Facility: CLINIC | Age: 54
End: 2024-10-09
Payer: COMMERCIAL

## 2024-10-09 DIAGNOSIS — E78.5 HYPERLIPIDEMIA LDL GOAL <130: ICD-10-CM

## 2024-10-09 DIAGNOSIS — Z12.5 SCREENING FOR PROSTATE CANCER: ICD-10-CM

## 2024-10-09 LAB
ALT SERPL W P-5'-P-CCNC: 32 U/L (ref 0–70)
CHOLEST SERPL-MCNC: 145 MG/DL
FASTING STATUS PATIENT QL REPORTED: YES
FASTING STATUS PATIENT QL REPORTED: YES
GLUCOSE SERPL-MCNC: 111 MG/DL (ref 70–99)
HDLC SERPL-MCNC: 46 MG/DL
LDLC SERPL CALC-MCNC: 78 MG/DL
NONHDLC SERPL-MCNC: 99 MG/DL
PSA SERPL DL<=0.01 NG/ML-MCNC: 0.55 NG/ML (ref 0–3.5)
TRIGL SERPL-MCNC: 106 MG/DL

## 2024-10-09 PROCEDURE — 80061 LIPID PANEL: CPT

## 2024-10-09 PROCEDURE — 36415 COLL VENOUS BLD VENIPUNCTURE: CPT

## 2024-10-09 PROCEDURE — 82947 ASSAY GLUCOSE BLOOD QUANT: CPT

## 2024-10-09 PROCEDURE — 84460 ALANINE AMINO (ALT) (SGPT): CPT

## 2024-10-09 PROCEDURE — G0103 PSA SCREENING: HCPCS

## 2024-10-18 ENCOUNTER — LAB (OUTPATIENT)
Dept: LAB | Facility: CLINIC | Age: 54
End: 2024-10-18
Payer: COMMERCIAL

## 2024-10-18 DIAGNOSIS — R73.09 ELEVATED GLUCOSE: ICD-10-CM

## 2024-10-18 LAB
EST. AVERAGE GLUCOSE BLD GHB EST-MCNC: 108 MG/DL
FASTING STATUS PATIENT QL REPORTED: YES
GLUCOSE SERPL-MCNC: 98 MG/DL (ref 70–99)
HBA1C MFR BLD: 5.4 % (ref 0–5.6)

## 2024-10-18 PROCEDURE — 83036 HEMOGLOBIN GLYCOSYLATED A1C: CPT

## 2024-10-18 PROCEDURE — 82947 ASSAY GLUCOSE BLOOD QUANT: CPT

## 2024-10-18 PROCEDURE — 36415 COLL VENOUS BLD VENIPUNCTURE: CPT

## 2024-12-26 ENCOUNTER — OFFICE VISIT (OUTPATIENT)
Dept: FAMILY MEDICINE | Facility: CLINIC | Age: 54
End: 2024-12-26
Payer: COMMERCIAL

## 2024-12-26 VITALS
OXYGEN SATURATION: 98 % | DIASTOLIC BLOOD PRESSURE: 78 MMHG | RESPIRATION RATE: 14 BRPM | TEMPERATURE: 96.9 F | SYSTOLIC BLOOD PRESSURE: 112 MMHG | BODY MASS INDEX: 30.73 KG/M2 | HEIGHT: 71 IN | WEIGHT: 219.5 LBS | HEART RATE: 76 BPM

## 2024-12-26 DIAGNOSIS — Z00.00 ROUTINE GENERAL MEDICAL EXAMINATION AT A HEALTH CARE FACILITY: Primary | ICD-10-CM

## 2024-12-26 SDOH — HEALTH STABILITY: PHYSICAL HEALTH: ON AVERAGE, HOW MANY DAYS PER WEEK DO YOU ENGAGE IN MODERATE TO STRENUOUS EXERCISE (LIKE A BRISK WALK)?: 4 DAYS

## 2024-12-26 SDOH — HEALTH STABILITY: PHYSICAL HEALTH: ON AVERAGE, HOW MANY MINUTES DO YOU ENGAGE IN EXERCISE AT THIS LEVEL?: 40 MIN

## 2024-12-26 ASSESSMENT — SOCIAL DETERMINANTS OF HEALTH (SDOH): HOW OFTEN DO YOU GET TOGETHER WITH FRIENDS OR RELATIVES?: ONCE A WEEK

## 2024-12-26 ASSESSMENT — PAIN SCALES - GENERAL: PAINLEVEL_OUTOF10: NO PAIN (0)

## 2024-12-26 NOTE — PATIENT INSTRUCTIONS
Patient Education   Preventive Care Advice   This is general advice given by our system to help you stay healthy. However, your care team may have specific advice just for you. Please talk to your care team about your preventive care needs.  Nutrition  Eat 5 or more servings of fruits and vegetables each day.  Try wheat bread, brown rice and whole grain pasta (instead of white bread, rice, and pasta).  Get enough calcium and vitamin D. Check the label on foods and aim for 100% of the RDA (recommended daily allowance).  Lifestyle  Exercise at least 150 minutes each week  (30 minutes a day, 5 days a week).  Do muscle strengthening activities 2 days a week. These help control your weight and prevent disease.  No smoking.  Wear sunscreen to prevent skin cancer.  Have a dental exam and cleaning every 6 months.  Yearly exams  See your health care team every year to talk about:  Any changes in your health.  Any medicines your care team has prescribed.  Preventive care, family planning, and ways to prevent chronic diseases.  Shots (vaccines)   HPV shots (up to age 26), if you've never had them before.  Hepatitis B shots (up to age 59), if you've never had them before.  COVID-19 shot: Get this shot when it's due.  Flu shot: Get a flu shot every year.  Tetanus shot: Get a tetanus shot every 10 years.  Pneumococcal, hepatitis A, and RSV shots: Ask your care team if you need these based on your risk.  Shingles shot (for age 50 and up)  General health tests  Diabetes screening:  Starting at age 35, Get screened for diabetes at least every 3 years.  If you are younger than age 35, ask your care team if you should be screened for diabetes.  Cholesterol test: At age 39, start having a cholesterol test every 5 years, or more often if advised.  Bone density scan (DEXA): At age 50, ask your care team if you should have this scan for osteoporosis (brittle bones).  Hepatitis C: Get tested at least once in your life.  STIs (sexually  transmitted infections)  Before age 24: Ask your care team if you should be screened for STIs.  After age 24: Get screened for STIs if you're at risk. You are at risk for STIs (including HIV) if:  You are sexually active with more than one person.  You don't use condoms every time.  You or a partner was diagnosed with a sexually transmitted infection.  If you are at risk for HIV, ask about PrEP medicine to prevent HIV.  Get tested for HIV at least once in your life, whether you are at risk for HIV or not.  Cancer screening tests  Cervical cancer screening: If you have a cervix, begin getting regular cervical cancer screening tests starting at age 21.  Breast cancer scan (mammogram): If you've ever had breasts, begin having regular mammograms starting at age 40. This is a scan to check for breast cancer.  Colon cancer screening: It is important to start screening for colon cancer at age 45.  Have a colonoscopy test every 10 years (or more often if you're at risk) Or, ask your provider about stool tests like a FIT test every year or Cologuard test every 3 years.  To learn more about your testing options, visit:   .  For help making a decision, visit:   https://bit.ly/vk11178.  Prostate cancer screening test: If you have a prostate, ask your care team if a prostate cancer screening test (PSA) at age 55 is right for you.  Lung cancer screening: If you are a current or former smoker ages 50 to 80, ask your care team if ongoing lung cancer screenings are right for you.  For informational purposes only. Not to replace the advice of your health care provider. Copyright   2023 [facility]. All rights reserved. Clinically reviewed by the [facility] Transitions Program. Datadog 516980 - REV 01/24.  Learning About Stress  What is stress?     Stress is your body's response to a hard situation. Your body can have a physical, emotional, or mental response. Stress is a fact of life for most people, and it  affects everyone differently. What causes stress for you may not be stressful for someone else.  A lot of things can cause stress. You may feel stress when you go on a job interview, take a test, or run a race. This kind of short-term stress is normal and even useful. It can help you if you need to work hard or react quickly. For example, stress can help you finish an important job on time.  Long-term stress is caused by ongoing stressful situations or events. Examples of long-term stress include long-term health problems, ongoing problems at work, or conflicts in your family. Long-term stress can harm your health.  How does stress affect your health?  When you are stressed, your body responds as though you are in danger. It makes hormones that speed up your heart, make you breathe faster, and give you a burst of energy. This is called the fight-or-flight stress response. If the stress is over quickly, your body goes back to normal and no harm is done.  But if stress happens too often or lasts too long, it can have bad effects. Long-term stress can make you more likely to get sick, and it can make symptoms of some diseases worse. If you tense up when you are stressed, you may develop neck, shoulder, or low back pain. Stress is linked to high blood pressure and heart disease.  Stress also harms your emotional health. It can make you ocampo, tense, or depressed. Your relationships may suffer, and you may not do well at work or school.  What can you do to manage stress?  You can try these things to help manage stress:   Do something active. Exercise or activity can help reduce stress. Walking is a great way to get started. Even everyday activities such as housecleaning or yard work can help.  Try yoga or lita chi. These techniques combine exercise and meditation. You may need some training at first to learn them.  Do something you enjoy. For example, listen to music or go to a movie. Practice your hobby or do volunteer  "work.  Meditate. This can help you relax, because you are not worrying about what happened before or what may happen in the future.  Do guided imagery. Imagine yourself in any setting that helps you feel calm. You can use online videos, books, or a teacher to guide you.  Do breathing exercises. For example:  From a standing position, bend forward from the waist with your knees slightly bent. Let your arms dangle close to the floor.  Breathe in slowly and deeply as you return to a standing position. Roll up slowly and lift your head last.  Hold your breath for just a few seconds in the standing position.  Breathe out slowly and bend forward from the waist.  Let your feelings out. Talk, laugh, cry, and express anger when you need to. Talking with supportive friends or family, a counselor, or a petra leader about your feelings is a healthy way to relieve stress. Avoid discussing your feelings with people who make you feel worse.  Write. It may help to write about things that are bothering you. This helps you find out how much stress you feel and what is causing it. When you know this, you can find better ways to cope.  What can you do to prevent stress?  You might try some of these things to help prevent stress:  Manage your time. This helps you find time to do the things you want and need to do.  Get enough sleep. Your body recovers from the stresses of the day while you are sleeping.  Get support. Your family, friends, and community can make a difference in how you experience stress.  Limit your news feed. Avoid or limit time on social media or news that may make you feel stressed.  Do something active. Exercise or activity can help reduce stress. Walking is a great way to get started.  Where can you learn more?  Go to https://www.[employer].net/patiented  Enter N032 in the search box to learn more about \"Learning About Stress.\"  Current as of: October 24, 2023  Content Version: 14.3    2024 [employer]. "   Care instructions adapted under license by your healthcare professional. If you have questions about a medical condition or this instruction, always ask your healthcare professional. [employer] disclaims any warranty or liability for your use of this information.

## 2024-12-26 NOTE — PROGRESS NOTES
"Preventive Care Visit  [facility] MARQUITA Najera MD, Family Practice  Dec 26, 2024      Assessment & Plan     Routine general medical examination at a health care facility    Patient has been advised of split billing requirements and indicates understanding: Yes    BMI  Estimated body mass index is 30.61 kg/m  as calculated from the following:    Height as of this encounter: 1.803 m (5' 11\").    Weight as of this encounter: 99.6 kg (219 lb 8 oz).   Weight management plan: Discussed healthy diet and exercise guidelines    Counseling  Appropriate preventive services were addressed with this patient via screening, questionnaire, or discussion as appropriate for fall prevention, nutrition, physical activity, Tobacco-use cessation, social engagement, weight loss and cognition.  Checklist reviewing preventive services available has been given to the patient.  Reviewed patient's diet, addressing concerns and/or questions.   He is at risk for psychosocial distress and has been provided with information to reduce risk.       Fadi Brantley is a 54 year old, presenting for the following:  Physical        12/26/2024     7:22 AM   Additional Questions   Roomed by Mahi Gould CMA   Accompanied by Self          HPI    -Declined flu and covid vaccines today.      Health Care Directive  Patient does not have a Health Care Directive: Discussed advance care planning with patient; however, patient declined at this time.        12/26/2024   General Health   How would you rate your overall physical health? Good   Feel stress (tense, anxious, or unable to sleep) To some extent   (!) STRESS CONCERN      12/26/2024   Nutrition   Three or more servings of calcium each day? Yes   Diet: Low salt    Low fat/cholesterol   How many servings of fruit and vegetables per day? (!) 2-3   How many sweetened beverages each day? 0-1       Multiple values from one day are sorted in reverse-chronological order         12/26/2024 "   Exercise   Days per week of moderate/strenous exercise 4 days   Average minutes spent exercising at this level 40 min         12/26/2024   Social Factors   Frequency of gathering with friends or relatives Once a week   Worry food won't last until get money to buy more No   Food not last or not have enough money for food? No   Do you have housing? (Housing is defined as stable permanent housing and does not include staying ouside in a car, in a tent, in an abandoned building, in an overnight shelter, or couch-surfing.) Yes   Are you worried about losing your housing? No   Lack of transportation? No   Unable to get utilities (heat,electricity)? No         12/26/2024   Fall Risk   Fallen 2 or more times in the past year? No   Trouble with walking or balance? No          12/26/2024   Dental   Dentist two times every year? Yes         12/26/2024   TB Screening   Were you born outside of the [place]? No           Today's PHQ-2 Score:       1/29/2024    12:44 PM   PHQ-2 ( 1999 [employer])   Q1: Little interest or pleasure in doing things 0   Q2: Feeling down, depressed or hopeless 0   PHQ-2 Score 0         12/26/2024   Substance Use   Alcohol more than 3/day or more than 7/wk No   Do you use any other substances recreationally? No     Social History     Tobacco Use    Smoking status: Never    Smokeless tobacco: Never   Substance Use Topics    Alcohol use: Not Currently    Drug use: No           12/26/2024   STI Screening   New sexual partner(s) since last STI/HIV test? No   ASCVD Risk   The 10-year ASCVD risk score (Raffi GUTIERRES, et al., 2019) is: 3%    Values used to calculate the score:      Age: 54 years      Sex: Male      Is Non- : No      Diabetic: No      Tobacco smoker: No      Systolic Blood Pressure: 112 mmHg      Is BP treated: No      HDL Cholesterol: 46 mg/dL      Total Cholesterol: 145 mg/dL         Reviewed and updated as needed this visit by Provider                    Review of  "Systems  Constitutional, HEENT, cardiovascular, pulmonary, gi and gu systems are negative, except as otherwise noted.     Objective    Exam  /78 (BP Location: Right arm, Patient Position: Sitting, Cuff Size: Adult Large)   Pulse 76   Temp 96.9  F (36.1  C) (Tympanic)   Resp 14   Ht 1.803 m (5' 11\")   Wt 99.6 kg (219 lb 8 oz)   SpO2 98%   BMI 30.61 kg/m     Estimated body mass index is 30.61 kg/m  as calculated from the following:    Height as of this encounter: 1.803 m (5' 11\").    Weight as of this encounter: 99.6 kg (219 lb 8 oz).    Physical Exam  GENERAL: alert and no distress  NECK: no adenopathy, no asymmetry, masses, or scars  RESP: lungs clear to auscultation - no rales, rhonchi or wheezes  CV: regular rate and rhythm, normal S1 S2, no S3 or S4, no murmur, click or rub, no peripheral edema  ABDOMEN: soft, nontender, no hepatosplenomegaly, no masses and bowel sounds normal  MS: no gross musculoskeletal defects noted, no edema    Signed Electronically by: Ranulfo Najera MD    "

## 2025-02-24 ENCOUNTER — ALLIED HEALTH/NURSE VISIT (OUTPATIENT)
Dept: FAMILY MEDICINE | Facility: CLINIC | Age: 55
End: 2025-02-24
Payer: COMMERCIAL

## 2025-02-24 VITALS — TEMPERATURE: 97.9 F

## 2025-02-24 DIAGNOSIS — Z23 ENCOUNTER FOR IMMUNIZATION: ICD-10-CM

## 2025-02-24 DIAGNOSIS — Z23 NEED FOR VACCINATION: Primary | ICD-10-CM

## 2025-02-24 PROCEDURE — 90746 HEPB VACCINE 3 DOSE ADULT IM: CPT

## 2025-02-24 PROCEDURE — 90472 IMMUNIZATION ADMIN EACH ADD: CPT

## 2025-02-24 PROCEDURE — 90471 IMMUNIZATION ADMIN: CPT

## 2025-02-24 PROCEDURE — 90750 HZV VACC RECOMBINANT IM: CPT

## 2025-02-24 NOTE — PROGRESS NOTES
Prior to immunization administration, verified patients identity using patient s name and date of birth. Please see Immunization Activity for additional information.     Screening Questionnaire for Adult Immunization    Are you sick today?   No   Do you have allergies to medications, food, a vaccine component or latex?   No   Have you ever had a serious reaction after receiving a vaccination?   NO   Do you have a long-term health problem with heart, lung, kidney, or metabolic disease (e.g., diabetes), asthma, a blood disorder, no spleen, complement component deficiency, a cochlear implant, or a spinal fluid leak?  Are you on long-term aspirin therapy?   No   Do you have cancer, leukemia, HIV/AIDS, or any other immune system problem?   No   Do you have a parent, brother, or sister with an immune system problem?   No   In the past 3 months, have you taken medications that affect  your immune system, such as prednisone, other steroids, or anticancer drugs; drugs for the treatment of rheumatoid arthritis, Crohn s disease, or psoriasis; or have you had radiation treatments?   No   Have you had a seizure, or a brain or other nervous system problem?   No   During the past year, have you received a transfusion of blood or blood    products, or been given immune (gamma) globulin or antiviral drug?   No   For women: Are you pregnant or is there a chance you could become       pregnant during the next month?   No   Have you received any vaccinations in the past 4 weeks?   No     Immunization questionnaire answers were all negative.    I have reviewed the following standing orders:   This patient is due and qualifies for the Hepatitis B vaccine.    Click here for Hepatitis B Standing Order    I have reviewed the vaccines inclusion and exclusion criteria; No concerns regarding eligibility.         This patient is due and qualifies for the Zoster vaccine.    Click here for Zoster Standing Order    I have reviewed the vaccines  inclusion and exclusion criteria; No concerns regarding eligibility.     Patient instructed to remain in clinic for 15 minutes afterwards, and to report any adverse reactions.     Screening performed by Lashonda Castro MA on 2/24/2025 at 2:03 PM.      Prior to immunization administration, verified patients identity using patient s name and date of birth. Please see Immunization Activity for additional information.     Is the patient's temperature normal (100.5 or less)? Yes     Patient MEETS CRITERIA. PROCEED with vaccine administration.      Patient instructed to remain in clinic for 15 minutes afterwards, and to report any adverse reactions.      Link to Ancillary Visit Immunization Standing Orders SmartSet     Screening performed by Lashonda Castro MA on 2/24/2025 at 2:15 PM.

## 2025-06-24 ENCOUNTER — ALLIED HEALTH/NURSE VISIT (OUTPATIENT)
Dept: FAMILY MEDICINE | Facility: CLINIC | Age: 55
End: 2025-06-24
Payer: COMMERCIAL

## 2025-06-24 DIAGNOSIS — Z23 ENCOUNTER FOR IMMUNIZATION: Primary | ICD-10-CM

## 2025-06-24 PROCEDURE — 99207 PR NO CHARGE NURSE ONLY: CPT

## 2025-06-24 PROCEDURE — 90471 IMMUNIZATION ADMIN: CPT

## 2025-06-24 PROCEDURE — 90746 HEPB VACCINE 3 DOSE ADULT IM: CPT

## 2025-07-26 DIAGNOSIS — R93.1 ELEVATED CORONARY ARTERY CALCIUM SCORE: ICD-10-CM

## 2025-07-26 DIAGNOSIS — Z82.49 FAMILY HISTORY OF HEART DISEASE: ICD-10-CM

## 2025-07-28 RX ORDER — ATORVASTATIN CALCIUM 40 MG/1
40 TABLET, FILM COATED ORAL DAILY
Qty: 90 TABLET | Refills: 0 | Status: SHIPPED | OUTPATIENT
Start: 2025-07-28

## (undated) RX ORDER — LIDOCAINE HYDROCHLORIDE 10 MG/ML
INJECTION, SOLUTION EPIDURAL; INFILTRATION; INTRACAUDAL; PERINEURAL
Status: DISPENSED
Start: 2021-03-29

## (undated) RX ORDER — PROPOFOL 10 MG/ML
INJECTION, EMULSION INTRAVENOUS
Status: DISPENSED
Start: 2021-03-29